# Patient Record
Sex: FEMALE | Race: BLACK OR AFRICAN AMERICAN
[De-identification: names, ages, dates, MRNs, and addresses within clinical notes are randomized per-mention and may not be internally consistent; named-entity substitution may affect disease eponyms.]

---

## 2018-05-15 ENCOUNTER — HOSPITAL ENCOUNTER (INPATIENT)
Dept: HOSPITAL 17 - NEPC | Age: 78
LOS: 2 days | Discharge: HOME HEALTH | DRG: 313 | End: 2018-05-17
Attending: HOSPITALIST | Admitting: HOSPITALIST
Payer: COMMERCIAL

## 2018-05-15 VITALS
HEART RATE: 73 BPM | SYSTOLIC BLOOD PRESSURE: 162 MMHG | RESPIRATION RATE: 20 BRPM | TEMPERATURE: 97.7 F | DIASTOLIC BLOOD PRESSURE: 93 MMHG | OXYGEN SATURATION: 99 %

## 2018-05-15 VITALS
DIASTOLIC BLOOD PRESSURE: 122 MMHG | SYSTOLIC BLOOD PRESSURE: 175 MMHG | RESPIRATION RATE: 16 BRPM | HEART RATE: 93 BPM | OXYGEN SATURATION: 100 % | TEMPERATURE: 98.4 F

## 2018-05-15 VITALS
OXYGEN SATURATION: 94 % | HEART RATE: 77 BPM | RESPIRATION RATE: 19 BRPM | SYSTOLIC BLOOD PRESSURE: 140 MMHG | DIASTOLIC BLOOD PRESSURE: 88 MMHG | TEMPERATURE: 98.1 F

## 2018-05-15 VITALS
HEART RATE: 78 BPM | RESPIRATION RATE: 16 BRPM | SYSTOLIC BLOOD PRESSURE: 152 MMHG | DIASTOLIC BLOOD PRESSURE: 96 MMHG | OXYGEN SATURATION: 96 %

## 2018-05-15 VITALS
DIASTOLIC BLOOD PRESSURE: 126 MMHG | RESPIRATION RATE: 15 BRPM | OXYGEN SATURATION: 99 % | HEART RATE: 82 BPM | SYSTOLIC BLOOD PRESSURE: 170 MMHG

## 2018-05-15 VITALS
TEMPERATURE: 98.3 F | DIASTOLIC BLOOD PRESSURE: 81 MMHG | OXYGEN SATURATION: 96 % | RESPIRATION RATE: 18 BRPM | HEART RATE: 102 BPM | SYSTOLIC BLOOD PRESSURE: 114 MMHG

## 2018-05-15 VITALS — DIASTOLIC BLOOD PRESSURE: 102 MMHG | SYSTOLIC BLOOD PRESSURE: 144 MMHG

## 2018-05-15 DIAGNOSIS — F17.210: ICD-10-CM

## 2018-05-15 DIAGNOSIS — E78.5: ICD-10-CM

## 2018-05-15 DIAGNOSIS — I25.2: ICD-10-CM

## 2018-05-15 DIAGNOSIS — Z90.710: ICD-10-CM

## 2018-05-15 DIAGNOSIS — R63.4: ICD-10-CM

## 2018-05-15 DIAGNOSIS — J44.9: ICD-10-CM

## 2018-05-15 DIAGNOSIS — K21.9: ICD-10-CM

## 2018-05-15 DIAGNOSIS — F32.9: ICD-10-CM

## 2018-05-15 DIAGNOSIS — I71.2: ICD-10-CM

## 2018-05-15 DIAGNOSIS — Z88.5: ICD-10-CM

## 2018-05-15 DIAGNOSIS — Z88.6: ICD-10-CM

## 2018-05-15 DIAGNOSIS — R07.89: Primary | ICD-10-CM

## 2018-05-15 DIAGNOSIS — Z88.8: ICD-10-CM

## 2018-05-15 DIAGNOSIS — I11.9: ICD-10-CM

## 2018-05-15 DIAGNOSIS — Z86.79: ICD-10-CM

## 2018-05-15 DIAGNOSIS — F03.90: ICD-10-CM

## 2018-05-15 DIAGNOSIS — G40.909: ICD-10-CM

## 2018-05-15 DIAGNOSIS — R73.03: ICD-10-CM

## 2018-05-15 DIAGNOSIS — R54: ICD-10-CM

## 2018-05-15 DIAGNOSIS — Z91.14: ICD-10-CM

## 2018-05-15 LAB
ALBUMIN SERPL-MCNC: 3.2 GM/DL (ref 3.4–5)
ALP SERPL-CCNC: 154 U/L (ref 45–117)
ALT SERPL-CCNC: 14 U/L (ref 10–53)
AST SERPL-CCNC: 28 U/L (ref 15–37)
BASOPHILS # BLD AUTO: 0 TH/MM3 (ref 0–0.2)
BASOPHILS NFR BLD: 0.9 % (ref 0–2)
BILIRUB SERPL-MCNC: 0.4 MG/DL (ref 0.2–1)
BUN SERPL-MCNC: 9 MG/DL (ref 7–18)
CALCIUM SERPL-MCNC: 8.1 MG/DL (ref 8.5–10.1)
CHLORIDE SERPL-SCNC: 105 MEQ/L (ref 98–107)
CREAT SERPL-MCNC: 0.77 MG/DL (ref 0.5–1)
EOSINOPHIL # BLD: 0 TH/MM3 (ref 0–0.4)
EOSINOPHIL NFR BLD: 0.5 % (ref 0–4)
ERYTHROCYTE [DISTWIDTH] IN BLOOD BY AUTOMATED COUNT: 13.6 % (ref 11.6–17.2)
GFR SERPLBLD BASED ON 1.73 SQ M-ARVRAT: 88 ML/MIN (ref 89–?)
GLUCOSE SERPL-MCNC: 82 MG/DL (ref 74–106)
HCO3 BLD-SCNC: 23.9 MEQ/L (ref 21–32)
HCT VFR BLD CALC: 39.7 % (ref 35–46)
HGB BLD-MCNC: 13.1 GM/DL (ref 11.6–15.3)
INR PPP: 1.1 RATIO
LYMPHOCYTES # BLD AUTO: 1.3 TH/MM3 (ref 1–4.8)
LYMPHOCYTES NFR BLD AUTO: 25.8 % (ref 9–44)
MAGNESIUM SERPL-MCNC: 1.8 MG/DL (ref 1.5–2.5)
MCH RBC QN AUTO: 32.4 PG (ref 27–34)
MCHC RBC AUTO-ENTMCNC: 32.9 % (ref 32–36)
MCV RBC AUTO: 98.7 FL (ref 80–100)
MONOCYTE #: 0.6 TH/MM3 (ref 0–0.9)
MONOCYTES NFR BLD: 12.8 % (ref 0–8)
NEUTROPHILS # BLD AUTO: 2.9 TH/MM3 (ref 1.8–7.7)
NEUTROPHILS NFR BLD AUTO: 60 % (ref 16–70)
PLATELET # BLD: 107 TH/MM3 (ref 150–450)
PMV BLD AUTO: 9.3 FL (ref 7–11)
PROT SERPL-MCNC: 7.2 GM/DL (ref 6.4–8.2)
PROTHROMBIN TIME: 11.6 SEC (ref 9.8–11.6)
RBC # BLD AUTO: 4.03 MIL/MM3 (ref 4–5.3)
SODIUM SERPL-SCNC: 139 MEQ/L (ref 136–145)
TROPONIN I SERPL-MCNC: (no result) NG/ML (ref 0.02–0.05)
WBC # BLD AUTO: 4.9 TH/MM3 (ref 4–11)

## 2018-05-15 PROCEDURE — 84484 ASSAY OF TROPONIN QUANT: CPT

## 2018-05-15 PROCEDURE — 71045 X-RAY EXAM CHEST 1 VIEW: CPT

## 2018-05-15 PROCEDURE — 85025 COMPLETE CBC W/AUTO DIFF WBC: CPT

## 2018-05-15 PROCEDURE — 93005 ELECTROCARDIOGRAM TRACING: CPT

## 2018-05-15 PROCEDURE — 80053 COMPREHEN METABOLIC PANEL: CPT

## 2018-05-15 PROCEDURE — 82550 ASSAY OF CK (CPK): CPT

## 2018-05-15 PROCEDURE — 71275 CT ANGIOGRAPHY CHEST: CPT

## 2018-05-15 PROCEDURE — 85730 THROMBOPLASTIN TIME PARTIAL: CPT

## 2018-05-15 PROCEDURE — 83690 ASSAY OF LIPASE: CPT

## 2018-05-15 PROCEDURE — 82552 ASSAY OF CPK IN BLOOD: CPT

## 2018-05-15 PROCEDURE — 83735 ASSAY OF MAGNESIUM: CPT

## 2018-05-15 PROCEDURE — 85610 PROTHROMBIN TIME: CPT

## 2018-05-15 PROCEDURE — 74174 CTA ABD&PLVS W/CONTRAST: CPT

## 2018-05-15 RX ADMIN — ATORVASTATIN CALCIUM SCH MG: 40 TABLET, FILM COATED ORAL at 20:43

## 2018-05-15 RX ADMIN — METOPROLOL TARTRATE SCH MG: 25 TABLET, FILM COATED ORAL at 20:43

## 2018-05-15 RX ADMIN — DONEPEZIL HYDROCHLORIDE SCH MG: 5 TABLET, FILM COATED ORAL at 20:43

## 2018-05-15 RX ADMIN — Medication SCH ML: at 20:44

## 2018-05-15 RX ADMIN — PHENYTOIN SODIUM SCH MG: 100 CAPSULE, EXTENDED RELEASE ORAL at 20:43

## 2018-05-15 NOTE — HHI.HP
__________________________________________________





HPI


Service


[facility]


Primary Care Physician


Unknown


Admission Diagnosis





Chest pain, history of AAA, history dissection, hypertension


Diagnoses:  


Travel History


International Travel<30 Days:  No


Contact w/Intl Traveler <30 Da:  No


Traveled to Known Affected Are:  No


History of Present Illness





78-year-old female history of dementia, thoracic aortic aneurysm who presents 

with a one-month history of intermittent sharp left sided chest pain radiating 

to left arm as well as stomach.  She is currently asymptomatic. she reports 

that this has been going on since April, occurs usually when she is upset that 

her  has fallen out of bed.  She says she has run out of some of her 

medications recently but is not sure of the names of them.  She says she was 

supposed to go to a doctor's appointment today but missed it, which is why she 

is here.  Again, she denies any current pain.  She denies any nausea, vomiting, 

lightheadedness, dizziness, fevers, chills. 





 She does report chronic ongoing weight loss, unable to quantify, which she 

attributes to poor appetite.





Review of Systems


Except as stated in HPI:  all other systems reviewed are Neg





Past Family Social History


Past Medical History


Hypertension


COPD


Abdominal and thoracic aortic aneurysm


Seizure disorder


Depression


GERD


Past Surgical History


Left forearm surgery


Brain surgery for aneurysm over 15 years ago.


Hysterectomy


Reported Medications


Reported Meds & Active Scripts


Active


Reported


Vitamin B Complex-C (B Complex W/ C) 1 Cap Cap   


Meloxicam 7.5 Mg Tab 7.5 Mg PO DAILY


Atorvastatin (Atorvastatin Calcium) 40 Mg Tab 40 Mg PO HS


Decara (Cholecalciferol) 50,000 Unit Cap 50,000 Units PO Q7D


Trazodone (Trazodone HCl) 50 Mg Tab 2 Tab PO HS


Metoprolol Tartrate 25 Mg Tab 25 Mg PO BID


Hydrochlorothiazide 25 Mg Tab 25 Mg PO DAILY


Phenytoin Extended 100 Mg Cap 100 Mg PO BID


Omeprazole 40 Mg Cap 40 Mg  DAILY


Ventolin Hfa 18 GM Inh (Albuterol Sulfate) 90 Mcg/Act Aer 2 Puff INH Q4-6H PRN


Sertraline (Sertraline HCl) 50 Mg Tab 50 Mg PO DAILY


Losartan (Losartan Potassium) 50 Mg Tab 50 Mg PO DAILY


Donepezil 5 Mg Tab 5 Mg PO HS


Allergies:  


Coded Allergies:  


     *MDRO Multi-Drug Resistant Organism (Verified  Allergy, Unknown, 5/15/18)


 C-diff 2014


     acetaminophen (Verified  Allergy, Unknown, 5/15/18)


     codeine (Verified  Allergy, Unknown, 5/15/18)


     MRI PRECAUTION (Verified  Adverse Reaction, Severe, ANEURYSM CLIP-(JLT)   DR. MERCADO, 5/15/18)


Family History


Patient says father had a stroke, mother had a heart problem


Social History


Patient reports that she smokes 2 cigarettes per day, and has smoked for the 

past 50 years.  Denies any alcohol or illicit drugs.





Physical Exam


Vital Signs





Vital Signs








  Date Time  Temp Pulse Resp B/P (MAP) Pulse Ox O2 Delivery O2 Flow Rate FiO2


 


5/15/18 17:07 97.7 73 20 162/93 (116) 99   


 


5/15/18 16:25        


 


5/15/18 15:46 98.1 77 19 140/88 (105) 94 Room Air  


 


5/15/18 12:17    144/102 (116)    


 


5/15/18 11:31  78 16 152/96 (114) 96 Room Air  


 


5/15/18 10:45  82 15 170/126 (141) 99 Room Air  


 


5/15/18 10:16  90 17  99 Room Air  


 


5/15/18 10:16 98.4 93 16 175/117 (136) 100 Room Air  





    186/122 (143)    








Physical Exam


GENERAL: This is a well-nourished, well-developed patient, in no apparent 

distress. 


SKIN: No rashes, ecchymoses or lesions. Cool and dry.


HEAD: Atraumatic. Normocephalic. No temporal or scalp tenderness.


EYES: Pupils equal round and reactive. Extraocular motions intact. No scleral 

icterus. No injection or drainage. 


ENT: Nose without bleeding, purulent drainage or septal hematoma. Throat 

without erythema, tonsillar hypertrophy or exudate. Uvula midline. Airway 

patent.


NECK: Trachea midline. No JVD or lymphadenopathy. Supple, nontender, no 

meningeal signs.


CARDIOVASCULAR: Regular rate and rhythm without murmurs, gallops, or rubs.  

Pulses equal bilaterally in upper extremities.


RESPIRATORY: Clear to auscultation. Breath sounds equal bilaterally. No wheezes

, rales, or rhonchi.  


GASTROINTESTINAL: Abdomen soft, non-tender, nondistended. No hepato-splenomegaly

, or palpable masses. No guarding.


MUSCULOSKELETAL: Extremities without clubbing, cyanosis, or edema. No joint 

tenderness, effusion, or edema noted. No calf tenderness. Negative Homans sign 

bilaterally.


NEUROLOGICAL: Awake and alert. Cranial nerves II through XII intact.  Motor and 

sensory grossly within normal limits. Five out of 5 muscle strength in all 

muscle groups.  Normal speech.


Laboratory





Laboratory Tests








Test


  5/15/18


10:40


 


White Blood Count 4.9 


 


Red Blood Count 4.03 


 


Hemoglobin 13.1 


 


Hematocrit 39.7 


 


Mean Corpuscular Volume 98.7 


 


Mean Corpuscular Hemoglobin 32.4 


 


Mean Corpuscular Hemoglobin


Concent 32.9 


 


 


Red Cell Distribution Width 13.6 


 


Platelet Count 107 


 


Mean Platelet Volume 9.3 


 


Neutrophils (%) (Auto) 60.0 


 


Lymphocytes (%) (Auto) 25.8 


 


Monocytes (%) (Auto) 12.8 


 


Eosinophils (%) (Auto) 0.5 


 


Basophils (%) (Auto) 0.9 


 


Neutrophils # (Auto) 2.9 


 


Lymphocytes # (Auto) 1.3 


 


Monocytes # (Auto) 0.6 


 


Eosinophils # (Auto) 0.0 


 


Basophils # (Auto) 0.0 


 


CBC Comment DIFF FINAL 


 


Differential Comment  


 


Prothrombin Time 11.6 


 


Prothromb Time International


Ratio 1.1 


 


 


Activated Partial


Thromboplast Time 26.0 


 


 


Blood Urea Nitrogen 9 


 


Creatinine 0.77 


 


Random Glucose 82 


 


Total Protein 7.2 


 


Albumin 3.2 


 


Calcium Level 8.1 


 


Magnesium Level 1.8 


 


Alkaline Phosphatase 154 


 


Aspartate Amino Transf


(AST/SGOT) 28 


 


 


Alanine Aminotransferase


(ALT/SGPT) 14 


 


 


Total Bilirubin 0.4 


 


Sodium Level 139 


 


Potassium Level 3.7 


 


Chloride Level 105 


 


Carbon Dioxide Level 23.9 


 


Anion Gap 10 


 


Estimat Glomerular Filtration


Rate 88 


 


 


Total Creatine Kinase 87 


 


Troponin I LESS THAN 0.02 


 


Lipase 483 








Result Diagram:  


5/15/18 1040                                                                   

             5/15/18 1040





Imaging





Last Impressions








Chest X-Ray 5/15/18 1031 Signed





Impressions: 





 Service Date/Time:  Tuesday, May 15, 2018 11:10 - CONCLUSION: No acute 

disease.  





      Keanu Best MD 


 


Aorta CTA 5/15/18 1031 Signed





Impressions: 





 Service Date/Time:  Tuesday, May 15, 2018 12:25 - CONCLUSION:  1. The 





 examination demonstrates aneurysmal dilation of the ascending aorta aortic 

arch 





 and descending thoracic aorta. The aorta at its widest point in the arch 





 measures approximately 3.8 cm. This is only slightly larger than seen on 





 previous exam it should be noted on the prior exam there appears to have been 

an 





 acute thrombosis of a dissection flap. This has significantly improved in 





 appearance with partial healing. The proximal descending thoracic aorta is at 





 the upper limits of normal in caliber measuring 2.7 cm. 2. The distal thoracic 





 aorta demonstrates a focal area of contrast extending out into the mural 





 thrombus. This appears to be feeding the origin of an intercostal artery. 

There 





 was contrast in this area on the previous study of  however, it appears 

more 





 organized and slightly larger on today's exam.  3. The distalmost portion of 





 thoracic aorta is aneurysmal. Maximum dimension is essentially at the 





 diaphragmatic hiatus. The aorta measures 5.4 centimeters at this level. This 

is 





 similar in size compared to previous. 4. The abdominal aorta is aneurysmal in 





 its distal segment measuring 5.6 cm. This has enlarged when compared to 





 previous. The aorta measured 3.7 cm at this location on the prior. 5. 

Aneurysmal 





 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 The right 





 measures approximately 2.5 cm. The iliac circulation is mildly enlarged 

compared 





 to previous as well. Maximum dimension on the left was 2.2 cm on the prior.   

  





 Riaz Wilde MD 











Caprini VTE Risk Assessment


Caprini VTE Risk Assessment:  Mod/High Risk (score >= 2)


Caprini Risk Assessment Model











 Point Value = 1          Point Value = 2  Point Value = 3  Point Value = 5


 


Age 41-60


Minor surgery


BMI > 25 kg/m2


Swollen legs


Varicose veins


Pregnancy or postpartum


History of unexplained or recurrent


   spontaneous 


Oral contraceptives or hormone


   replacement


Sepsis (< 1 month)


Serious lung disease, including


   pneumonia (< 1 month)


Abnormal pulmonary function


Acute myocardial infarction


Congestive heart failure (< 1 month)


History of inflammatory bowel disease


Medical patient at bed rest Age 61-74


Arthroscopic surgery


Major open surgery (> 45 min)


Laparoscopic surgery (> 45 min)


Malignancy


Confined to bed (> 72 hours)


Immobilizing plaster cast


Central venous access Age >= 75


History of VTE


Family history of VTE


Factor V Leiden


Prothrombin 12857D


Lupus anticoagulant


Anticardiolipin antibodies


Elevated serum homocysteine


Heparin-induced thrombocytopenia


Other congenital or acquired


   thrombophilia Stroke (< 1 month)


Elective arthroplasty


Hip, pelvis, or leg fracture


Acute spinal cord injury (< 1 month)








Prophylaxis Regimen











   Total Risk


Factor Score Risk Level Prophylaxis Regimen


 


0-1      Low Early ambulation


 


2 Moderate Order ONE of the following:


*Sequential Compression Device (SCD)


*Heparin 5000 units SQ BID


 


3-4 Higher Order ONE of the following medications:


*Heparin 5000 units SQ TID


*Enoxaparin/Lovenox 40 mg SQ daily (WT < 150 kg, CrCl > 30 mL/min)


*Enoxaparin/Lovenox 30 mg SQ daily (WT < 150 kg, CrCl > 10-29 mL/min)


*Enoxaparin/Lovenox 30 mg SQ BID (WT < 150 kg, CrCl > 30 mL/min)


AND/OR


*Sequential Compression Device (SCD)


 


5 or more Highest Order ONE of the following medications:


*Heparin 5000 units SQ TID (Preferred with Epidurals)


*Enoxaparin/Lovenox 40 mg SQ daily (WT < 150 kg, CrCl > 30 mL/min)


*Enoxaparin/Lovenox 30 mg SQ daily (WT < 150 kg, CrCl > 10-29 mL/min)


*Enoxaparin/Lovenox 30 mg SQ BID (WT < 150 kg, CrCl > 30 mL/min)


AND


*Sequential Compression Device (SCD)











Assessment and Plan


Assessment and Plan


//Intermittent chest pain.  Currently asymptomatic


//History of thoracic aortic aneurysm.


= Patient previously noted not to be a surgical candidate for thoracic aortic 

aneurysm.


= Imaging of thoracic aorta as above with some gradual changes, however no 

acute changes


= Systolic blood pressures in the 180s on presentation.


= Blood pressure control.  Would be under systolic of 120.


= Trend EKGs and troponins.


= Apparent new T-wave inversion on EKG.  Cardiology consultation.





//Accelerated hypertension.  With systolic blood pressures in the 180s on 

admission.


= Restart home blood pressure meds and monitor.





//Chronic severe dementia.  Continue donepezil.








//Medication noncompliance


= Secondary to dementia





//Seizure disorder.  Chronic.


Continue phenytoin.





//Depression.  Chronic.  Continue Zoloft.  Will hold off on trazodone due to 

hypertension.





//GERD.  Chronic.  Continue PPI.





//Tobacco abuse.  Patient counseled on cessation.





//Chronic ongoing weight loss.  This could be secondary to dementia.  Tobacco 

cessation could be helpful.  Encourage p.o. intake.


Discussed Condition With


Patient, nurse, ED physician.











Hunter Nugent MD May 15, 2018 17:53

## 2018-05-15 NOTE — RADRPT
EXAM DATE/TIME:  05/15/2018 12:25 

 

[facility] COMPARISON:     

CTA THORACIC ABDOMINAL AORTA W 3D RECON, October 23, 2015, 3:46.

 

 

INDICATIONS :     

Abdominal and chest pain, left arm weakness.

                           

 

IV CONTRAST:     

150 cc Omnipaque 350 (iohexol) IV 

 

 

RADIATION DOSE:     

4.52 CTDIvol (mGy) 

 

 

MEDICAL HISTORY :     

Dementia. Cardiovascular disease Hypertension.Abdominal aneurysm

 

SURGICAL HISTORY :      

Hysterectomy. 

 

ENCOUNTER:      

Initial

 

ACUITY:      

1 week

 

PAIN SCALE:      

5/10

 

LOCATION:        

chest 

 

TECHNIQUE:     

Volumetric scanning was performed using a multi-row detector CT scanner.  The data was post processed
 with a variety of visualization algorithms including full volume maximum intensity projection, multi
-planar sliding thin slab reformation, curved planar reformation, and surface rendering techniques.  
Using automated exposure control and adjustment of the mA and/or kV according to patient size, radiat
ion dose was kept as low as reasonably achievable to obtain optimal diagnostic quality images.  DICOM
 format image data is available electronically for review and comparison.  

 

FINDINGS:     

 

LUNGS:     

The visualized pulmonary parenchyma demonstrates moderate COPD changes. No suspicious mass lesions ar
e identified. The heart is normal in size.

 

MEDIASTINUM:     

No significant hilar, mediastinal or axillary adenopathy is present.

 

ABDOMEN:     

The solid organs of the abdomen are grossly intact by arterial phase imaging. No retroperitoneal lucas
opathy is identified. There is no free air or free fluid.

 

PELVIS:     

There is no significant free fluid in the pelvis. No free air is seen. No iliac or inguinal adenopath
y is present.

 

AORTA/ILIAC :       

The examination demonstrates the aortic root to be normal in caliber. There is aneurysmal dilation of
 the ascending aorta with a maximum dimension of 3.7 cm. The arch is significantly dilated measuring 
3.8 cm in its mid aspect. There is normal anatomic branching of the great vessels from the arch. The 
origins of the great vessels are widely patent. The aorta tapers in size along the course of the dist
al arch and at measures approximately 2.7 cm in its proximal aspect. Distally, the examination demons
trates contrast projecting into the mural thrombus in its mid thoracic segment. This appears to be fe
eding in intercostal artery at this location. Beneath this there is significant aneurysmal enlargemen
t of the distal thoracic aorta measuring approximately 5.4 cm in its distal thoracic segment. The aor
ta remains aneurysmal across the origins of the celiac, SMA and renal arteries. Inferiorly the infrar
enal aorta is quite aneurysmal measuring 5.6 cm just above the iliac bifurcation. The aneurysmal dila
tion extends down to and involves the proximal common iliac arteries. The left common iliac measures 
5.3 CM. The proximal right common iliac measures 2.3 CM. The external iliac arteries are unopacified 
but appear normal in caliber.

 

CONCLUSION:     

1. The examination demonstrates aneurysmal dilation of the ascending aorta aortic arch and descending
 thoracic aorta. The aorta at its widest point in the arch measures approximately 3.8 cm. This is onl
y slightly larger than seen on previous exam it should be noted on the prior exam there appears to ha
ve been an acute thrombosis of a dissection flap. This has significantly improved in appearance with 
partial healing. The proximal descending thoracic aorta is at the upper limits of normal in caliber m
easuring 2.7 cm.

2. The distal thoracic aorta demonstrates a focal area of contrast extending out into the mural throm
bus. This appears to be feeding the origin of an intercostal artery. There was contrast in this area 
on the previous study of 2015 however, it appears more organized and slightly larger on today's exam.
 

3. The distalmost portion of thoracic aorta is aneurysmal. Maximum dimension is essentially at the di
aphragmatic hiatus. The aorta measures 5.4 centimeters at this level. This is similar in size compare
d to previous.

4. The abdominal aorta is aneurysmal in its distal segment measuring 5.6 cm. This has enlarged when c
ompared to previous. The aorta measured 3.7 cm at this location on the prior.

5. Aneurysmal dilation of the iliac arteries bilaterally the left measures 3.3 cm. The right measures
 approximately 2.5 cm. The iliac circulation is mildly enlarged compared to previous as well. Maximum
 dimension on the left was 2.2 cm on the prior. 

 

 

 Riaz Wilde MD on May 15, 2018 at 13:08           

Board Certified Radiologist.

 This report was verified electronically.

## 2018-05-15 NOTE — RADRPT
EXAM DATE/TIME:  05/15/2018 11:10 

 

[facility] COMPARISON:     

No previous studies available for comparison.

 

                     

INDICATIONS :     

Short of breath with chest tightness.

                     

 

MEDICAL HISTORY :     

Congestive heart failure.          

 

SURGICAL HISTORY :     

None.   

 

ENCOUNTER:     

Initial                                        

 

ACUITY:     

2 days      

 

PAIN SCORE:     

2/10

 

LOCATION:     

Bilateral chest 

 

FINDINGS:     

Hyperinflation and cardiomegaly. Atherosclerotic calcifications are seen. There is no consolidation o
r effusion. Descending thoracic aortic aneurysm with again noted.

 

CONCLUSION:     No acute disease.  

 

 

 

 Keanu Best MD on May 15, 2018 at 11:48           

Board Certified Radiologist.

 This report was verified electronically.

## 2018-05-15 NOTE — MB
cc:

Devante Stafford MD

****

 

 

DATE:

05/15/2018

 

REASON FOR CONSULTATION:

For evaluation of chest pain.

 

HISTORY OF PRESENT ILLNESS:

Katey Blake is an 82-year-old woman with hypertensive heart 

disease.  She has aortic aneurysms.  She was evaluated in [facility].  She

said she was told that she would not survive surgery on her aneurysms.

 She has had documented severe left ventricular hypertrophy by echo 

and by EKG and her blood pressure is poorly controlled.  She has been 

having intermittent chest pain for 2 weeks.  She describes it as an 

ache-like pain in the left chest; it comes and goes.  She is not 

currently having any.  Her blood pressure was 186/122 when she came to

the ER.  Apparently has had a previous aortic dissection based on the 

reading of the CT scan, which is showing some healing.  She is a 

lifelong smoker.  She has not smoked in about 5 days, but has not 

formally made a decision to quit smoking.  Her last nuclear stress 

test was 11/2014 and this showed no stress-induced ischemia.  Her 

initial troponin is normal.

 

PAST MEDICAL HISTORY:

Includes hypertensive heart disease.  There is mention in her history 

of some dementia, although she seems alert.  Hyperlipidemia, 

hypertension, COPD, murmur of aortic valve sclerosis, previous 

pancreatitis, previous seizures, tobacco use disorder.

 

PAST SURGICAL HISTORY:

Includes cerebral aneurysm surgery in 1998, cholecystectomy, 

hysterectomy.

 

SOCIAL HISTORY:

He is .  She smokes.  Denies alcohol.

 

ALLERGIES:

INCLUDE TYLENOL #3.

 

FAMILY HISTORY:

Positive for stroke in the father and heart disease in the mother.

 

PHYSICAL EXAMINATION:

GENERAL:  A thin -American female, alert and oriented, no acute

distress.

VITAL SIGNS:  Charted.   Blood pressure is very elevated.

HEENT:  Exam unremarkable.

NECK:  No JVD.  No bruits.

CHEST:  Shows diminished breath sounds throughout.

CARDIAC:  S1, S2.  Regular rate and rhythm with a prominent 2/6 

systolic ejection murmur suggestive of aortic valve sclerosis.

ABDOMEN:  Soft, nontender.

EXTREMITIES:  Reveal diminished pedal pulses.

 

LABORATORY DATA:

Charted.

Troponin is negative.

 

IMAGING STUDIES:

CTA was abnormal.  See report.

 

IMPRESSION:

1.  Hypertensive heart disease with severe uncontrolled hypertension. 

 

2.  Chest pain without currently any objective evidence for ischemia. 

 

3.  Electrocardiogram shows left ventricular hypertrophy, left 

ventricular strain from chronic hypertension and severe left 

ventricular hypertrophy.

4.  Tobacco abuse.

 

RECOMMENDATIONS:

1.  Quit smoking.

2.  Need to get her blood pressure under better control.

3.  May need a nephrology consult.

4.  We will see how she responds to the current medication regimen and

adjust as needed.

5.  Serial troponins to rule out myocardial infarction.

6.  Further therapy to be determined.

 

 

__________________________________

MD CED Lindo/SAVANA

D: 05/15/2018, 06:36 PM

T: 05/15/2018, 06:52 PM

Visit #: [id number]

Job #: [id number]

## 2018-05-15 NOTE — EKG
Date Performed: 05/15/2018       Time Performed: 10:27:00

 

PTAGE:      78 years

 

EKG:      Sinus rhythm 

 

 LEFT ATRIAL ENLARGEMENT POSSIBLE RIGHT VENTRICULAR CONDUCTION DELAY POSSIBLE LEFT VENTRICULAR HYPERT
ROPHY MODERATE T-WAVE ABNORMALITY, CONSIDER ANTEROLATERAL ISCHEMIA ABNORMAL ECG When compared to prio
r tracing,patient is now in sinus rythm and has P-wave inversions consistent with ischemia or hypertr
ophy. clinical correlation requested. 

 

 PREVIOUS TRACING            : 10/22/2015 08.12

 

DOCTOR:   Josephine Valverde  Interpretating Date/Time  05/15/2018 19:40:27

## 2018-05-16 VITALS
HEART RATE: 104 BPM | RESPIRATION RATE: 16 BRPM | TEMPERATURE: 99.6 F | SYSTOLIC BLOOD PRESSURE: 97 MMHG | OXYGEN SATURATION: 98 % | DIASTOLIC BLOOD PRESSURE: 76 MMHG

## 2018-05-16 VITALS
TEMPERATURE: 98.1 F | OXYGEN SATURATION: 98 % | HEART RATE: 73 BPM | RESPIRATION RATE: 18 BRPM | DIASTOLIC BLOOD PRESSURE: 70 MMHG | SYSTOLIC BLOOD PRESSURE: 110 MMHG

## 2018-05-16 VITALS
RESPIRATION RATE: 18 BRPM | HEART RATE: 83 BPM | TEMPERATURE: 98.5 F | OXYGEN SATURATION: 97 % | SYSTOLIC BLOOD PRESSURE: 129 MMHG | DIASTOLIC BLOOD PRESSURE: 87 MMHG

## 2018-05-16 VITALS
HEART RATE: 88 BPM | SYSTOLIC BLOOD PRESSURE: 149 MMHG | RESPIRATION RATE: 18 BRPM | DIASTOLIC BLOOD PRESSURE: 96 MMHG | TEMPERATURE: 98.3 F | OXYGEN SATURATION: 98 %

## 2018-05-16 VITALS
OXYGEN SATURATION: 97 % | HEART RATE: 87 BPM | RESPIRATION RATE: 16 BRPM | SYSTOLIC BLOOD PRESSURE: 92 MMHG | DIASTOLIC BLOOD PRESSURE: 58 MMHG | TEMPERATURE: 97.6 F

## 2018-05-16 VITALS — SYSTOLIC BLOOD PRESSURE: 110 MMHG | DIASTOLIC BLOOD PRESSURE: 75 MMHG

## 2018-05-16 VITALS — HEART RATE: 88 BPM

## 2018-05-16 LAB
ALBUMIN SERPL-MCNC: 3.3 GM/DL (ref 3.4–5)
ALP SERPL-CCNC: 174 U/L (ref 45–117)
ALT SERPL-CCNC: 15 U/L (ref 10–53)
AST SERPL-CCNC: 28 U/L (ref 15–37)
BASOPHILS # BLD AUTO: 0 TH/MM3 (ref 0–0.2)
BASOPHILS NFR BLD: 0.9 % (ref 0–2)
BILIRUB SERPL-MCNC: 0.3 MG/DL (ref 0.2–1)
BUN SERPL-MCNC: 12 MG/DL (ref 7–18)
CALCIUM SERPL-MCNC: 8.9 MG/DL (ref 8.5–10.1)
CHLORIDE SERPL-SCNC: 102 MEQ/L (ref 98–107)
CREAT SERPL-MCNC: 0.8 MG/DL (ref 0.5–1)
EOSINOPHIL # BLD: 0.1 TH/MM3 (ref 0–0.4)
EOSINOPHIL NFR BLD: 1.1 % (ref 0–4)
ERYTHROCYTE [DISTWIDTH] IN BLOOD BY AUTOMATED COUNT: 13.8 % (ref 11.6–17.2)
GFR SERPLBLD BASED ON 1.73 SQ M-ARVRAT: 84 ML/MIN (ref 89–?)
GLUCOSE SERPL-MCNC: 101 MG/DL (ref 74–106)
HCO3 BLD-SCNC: 30.4 MEQ/L (ref 21–32)
HCT VFR BLD CALC: 38.8 % (ref 35–46)
HGB BLD-MCNC: 12.9 GM/DL (ref 11.6–15.3)
LYMPHOCYTES # BLD AUTO: 1.5 TH/MM3 (ref 1–4.8)
LYMPHOCYTES NFR BLD AUTO: 26.7 % (ref 9–44)
MCH RBC QN AUTO: 32.5 PG (ref 27–34)
MCHC RBC AUTO-ENTMCNC: 33.1 % (ref 32–36)
MCV RBC AUTO: 98.1 FL (ref 80–100)
MONOCYTE #: 0.7 TH/MM3 (ref 0–0.9)
MONOCYTES NFR BLD: 12 % (ref 0–8)
NEUTROPHILS # BLD AUTO: 3.4 TH/MM3 (ref 1.8–7.7)
NEUTROPHILS NFR BLD AUTO: 59.3 % (ref 16–70)
PLATELET # BLD: 102 TH/MM3 (ref 150–450)
PMV BLD AUTO: 9.4 FL (ref 7–11)
PROT SERPL-MCNC: 7.5 GM/DL (ref 6.4–8.2)
RBC # BLD AUTO: 3.96 MIL/MM3 (ref 4–5.3)
SODIUM SERPL-SCNC: 139 MEQ/L (ref 136–145)
TROPONIN I SERPL-MCNC: 0.3 NG/ML (ref 0.02–0.05)
TROPONIN I SERPL-MCNC: 0.36 NG/ML (ref 0.02–0.05)
WBC # BLD AUTO: 5.7 TH/MM3 (ref 4–11)

## 2018-05-16 RX ADMIN — HYDROCHLOROTHIAZIDE SCH MG: 25 TABLET ORAL at 08:23

## 2018-05-16 RX ADMIN — DONEPEZIL HYDROCHLORIDE SCH MG: 5 TABLET, FILM COATED ORAL at 23:09

## 2018-05-16 RX ADMIN — Medication SCH ML: at 08:24

## 2018-05-16 RX ADMIN — NIFEDIPINE SCH MG: 90 TABLET, FILM COATED, EXTENDED RELEASE ORAL at 08:23

## 2018-05-16 RX ADMIN — METOPROLOL TARTRATE SCH MG: 25 TABLET, FILM COATED ORAL at 08:24

## 2018-05-16 RX ADMIN — SERTRALINE HYDROCHLORIDE SCH MG: 50 TABLET, FILM COATED ORAL at 08:22

## 2018-05-16 RX ADMIN — PHENYTOIN SODIUM SCH MG: 100 CAPSULE, EXTENDED RELEASE ORAL at 23:09

## 2018-05-16 RX ADMIN — PANTOPRAZOLE SCH MG: 40 TABLET, DELAYED RELEASE ORAL at 08:22

## 2018-05-16 RX ADMIN — PHENYTOIN SODIUM SCH MG: 100 CAPSULE, EXTENDED RELEASE ORAL at 08:23

## 2018-05-16 RX ADMIN — Medication SCH ML: at 23:09

## 2018-05-16 RX ADMIN — LOSARTAN POTASSIUM SCH MG: 50 TABLET, FILM COATED ORAL at 08:23

## 2018-05-16 RX ADMIN — ATORVASTATIN CALCIUM SCH MG: 40 TABLET, FILM COATED ORAL at 23:08

## 2018-05-16 RX ADMIN — METOPROLOL TARTRATE SCH MG: 25 TABLET, FILM COATED ORAL at 23:09

## 2018-05-16 RX ADMIN — ASPIRIN 81 MG SCH MG: 81 TABLET ORAL at 08:23

## 2018-05-16 NOTE — EKG
Date Performed: 05/15/2018       Time Performed: 16:55:58

 

PTAGE:      78 years

 

EKG:      Sinus rhythm 

 

 LEFT ATRIAL ENLARGEMENT POSSIBLE RIGHT VENTRICULAR CONDUCTION DELAY POSSIBLE LEFT VENTRICULAR HYPERT
ROPHY ST DEVIATION AND MARKED T-WAVE ABNORMALITY, CONSIDER ANTEROLATERAL ISCHEMIA ABNORMAL ECG

 

PREVIOUS TRACING       : 05/15/2018 10.27 Since the previous tracing, no significant change noted

 

DOCTOR:   Philip Lau  Interpretating Date/Time  05/16/2018 22:29:41

## 2018-05-16 NOTE — EKG
Date Performed: 05/15/2018       Time Performed: 22:09:50

 

PTAGE:      78 years

 

EKG:      Sinus rhythm 

 

 LEFT ATRIAL ENLARGEMENT POSSIBLE RIGHT VENTRICULAR CONDUCTION DELAY LEFT VENTRICULAR HYPERTROPHY AND
 ST-T CHANGE ABNORMAL ECG

 

PREVIOUS TRACING       : 05/15/2018 16.55 Since the previous tracing, no significant change noted

 

DOCTOR:   Philip Lau  Interpretating Date/Time  05/16/2018 22:22:12

## 2018-05-16 NOTE — HHI.PR
Subjective


Remarks


Follow up for chest pain, elevated troponin. The patient reports she woke up 

with left upper abdominal pain today and some associated nausea, but no 

vomiting.  She describes the pain as a 10/10 constant ache, without radiation.  

Denies any shortness of breath or diaphoresis.  Denies any specific chest pains 

today.  Denies any other medical complaints at this time.





Objective


Vitals





Vital Signs








  Date Time  Temp Pulse Resp B/P (MAP) Pulse Ox O2 Delivery O2 Flow Rate FiO2


 


5/16/18 07:35 97.6 87 16 92/58 (69) 97   


 


5/16/18 04:39 98.3 88 18 149/96 (113) 98   


 


5/16/18 04:00  88      


 


5/16/18 03:07 98.5 83 18 129/87 (101) 97   


 


5/15/18 20:07 98.3 102 18 114/81 (92) 96   


 


5/15/18 17:07 97.7 73 20 162/93 (116) 99   


 


5/15/18 16:25        


 


5/15/18 15:46 98.1 77 19 140/88 (105) 94 Room Air  


 


5/15/18 12:17    144/102 (116)    


 


5/15/18 11:31  78 16 152/96 (114) 96 Room Air  


 


5/15/18 10:45  82 15 170/126 (141) 99 Room Air  


 


5/15/18 10:16  90 17  99 Room Air  


 


5/15/18 10:16 98.4 93 16 175/117 (136) 100 Room Air  





    186/122 (143)    














I/O      


 


 5/15/18 5/15/18 5/15/18 5/16/18 5/16/18 5/16/18





 07:00 15:00 23:00 07:00 15:00 23:00


 


Intake Total  300 ml  720 ml  


 


Balance  300 ml  720 ml  


 


      


 


Intake Oral  300 ml  720 ml  


 


# Voids    2  








Result Diagram:  


5/16/18 0202                                                                   

             5/16/18 0202





Imaging





Last Impressions








Chest X-Ray 5/15/18 1031 Signed





Impressions: 





 Service Date/Time:  Tuesday, May 15, 2018 11:10 - CONCLUSION: No acute 

disease.  





      Keanu Best MD 


 


Aorta CTA 5/15/18 1031 Signed





Impressions: 





 Service Date/Time:  Tuesday, May 15, 2018 12:25 - CONCLUSION:  1. The 





 examination demonstrates aneurysmal dilation of the ascending aorta aortic 

arch 





 and descending thoracic aorta. The aorta at its widest point in the arch 





 measures approximately 3.8 cm. This is only slightly larger than seen on 





 previous exam it should be noted on the prior exam there appears to have been 

an 





 acute thrombosis of a dissection flap. This has significantly improved in 





 appearance with partial healing. The proximal descending thoracic aorta is at 





 the upper limits of normal in caliber measuring 2.7 cm. 2. The distal thoracic 





 aorta demonstrates a focal area of contrast extending out into the mural 





 thrombus. This appears to be feeding the origin of an intercostal artery. 

There 





 was contrast in this area on the previous study of 2015 however, it appears 

more 





 organized and slightly larger on today's exam.  3. The distalmost portion of 





 thoracic aorta is aneurysmal. Maximum dimension is essentially at the 





 diaphragmatic hiatus. The aorta measures 5.4 centimeters at this level. This 

is 





 similar in size compared to previous. 4. The abdominal aorta is aneurysmal in 





 its distal segment measuring 5.6 cm. This has enlarged when compared to 





 previous. The aorta measured 3.7 cm at this location on the prior. 5. 

Aneurysmal 





 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 The right 





 measures approximately 2.5 cm. The iliac circulation is mildly enlarged 

compared 





 to previous as well. Maximum dimension on the left was 2.2 cm on the prior.   

  





 Riaz Wilde MD 








Objective Remarks


GENERAL: Well-nourished, well-developed thin female patient in NAD.


SKIN: Warm and dry. No rash.


HEENT:  Normocephalic. Atraumatic. Pupils equal and round.  Mucous membranes 

pink and moist.


NECK: Supple. Trachea midline.  


CARDIOVASCULAR: Regular rate and rhythm.  No murmur appreciated. 


RESPIRATORY: No accessory muscle use. Clear to auscultation. Breath sounds 

equal bilaterally.  


GASTROINTESTINAL: Abdomen soft, nondistended, mildly tender in left upper 

quadrant. Normoactive bowel sounds x4.  Pulsatile abdomen.


MUSCULOSKELETAL: No obvious deformities. Extremities without clubbing, cyanosis

, or edema. 


NEUROLOGICAL: Awake and alert. No obvious cranial nerve deficits.  Motor 

grossly within normal limits. Moving all extremities spontaneously. Normal 

speech.


PSYCHIATRIC: Appropriate mood and affect; insight and judgment normal.


Medications and IVs





Current Medications








 Medications


  (Trade)  Dose


 Ordered  Sig/Jamee


 Route  Start Time


 Stop Time Status Last Admin


 


  (NS Flush)  2 ml  UNSCH  PRN


 IVF  5/15/18 10:45


    5/15/18 10:41


 


 


  (NS Flush)  2 ml  UNSCH  PRN


 IV FLUSH  5/15/18 14:45


     


 


 


  (NS Flush)  2 ml  BID


 IV FLUSH  5/15/18 21:00


    5/16/18 08:24


 


 


  (Narcan Inj)  0.4 mg  UNSCH  PRN


 IV PUSH  5/15/18 14:45


     


 


 


  (Milk Of


 Magnesia Liq)  30 ml  Q12H  PRN


 PO  5/15/18 14:45


     


 


 


  (Senokot)  17.2 mg  Q12H  PRN


 PO  5/15/18 14:45


     


 


 


  (Dulcolax Supp)  10 mg  DAILY  PRN


 RECTAL  5/15/18 14:45


     


 


 


  (Lactulose Liq)  30 ml  DAILY  PRN


 PO  5/15/18 14:45


     


 


 


  (Vasotec Inj)  1.25 mg  Q6H  PRN


 IV PUSH  5/15/18 14:45


     


 


 


  (Proair Hfa Inh)  2 puff  Q4HR  PRN


 INH  5/15/18 17:30


     


 


 


  (Lipitor)  40 mg  HS


 PO  5/15/18 21:00


    5/15/18 20:43


 


 


  (Aricept)  5 mg  HS


 PO  5/15/18 21:00


    5/15/18 20:43


 


 


  (Hydrodiuril)  25 mg  DAILY


 PO  5/16/18 09:00


    5/16/18 08:23


 


 


  (Cozaar)  50 mg  DAILY


 PO  5/16/18 09:00


    5/16/18 08:23


 


 


  (Lopressor)  25 mg  BID


 PO  5/15/18 21:00


    5/16/18 08:24


 


 


  (Dilantin)  100 mg  BID


 PO  5/15/18 21:00


    5/16/18 08:23


 


 


  (Zoloft)  50 mg  DAILY


 PO  5/16/18 09:00


    5/16/18 08:22


 


 


  (Protonix)  40 mg  DAILY


 PO  5/16/18 09:00


    5/16/18 08:22


 


 


  (Apresoline)  50 mg  Q8HR


 PO  5/15/18 17:45


    5/16/18 05:20


 


 


  (Procardia Xl)  90 mg  DAILY


 PO  5/16/18 09:00


    5/16/18 08:23


 


 


  (Aspirin Chew)  81 mg  DAILY


 CHEW  5/16/18 09:00


    5/16/18 08:23


 











A/P


Assessment and Plan


78-year-old female with history of HTN, abdominal and thoracic aortic aneurysms

, COPD, GERD, seizure disorder, depression, presents with intermittent chest 

pains.





Unstable angina, possible NSTEMI; patient with atypical chest pains 2 weeks, 

suspect unstable angina, patient is female with borderline diabetes.  


   -With history of thoracic and abdominal aneurysms, checked aorta CTA which 

showed slight enlargement of a sending aortic aneurysm, however improved and 

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 

thorax, abdomen, iliac arteries, all slightly enlarged compared to previous 

exam in 2015 but no acute dissection.  


   -Patient reportedly has been evaluated at [facility] for her aneurysms, told she 

would likely not survive procedure


   -Continue on aspirin


   -Trended troponins, 0.02, 0.02, 0.30, 0.36 and EKG with new T-wave inversion


   -Cardiology consulted, appreciate recommendations





Thoracic/Abdominal/Iliac Aneurysms: chronic


   -Aorta CTA checked as above, shows gradual enlargements of aneurysms 

compared to 2015


   -Previously evaluated by [facility], not surgical candidate


   -Control BP





Accelerated hypertension:  With BP 180s/120s on admission. Question compliance 

with medications. 


   -Restart home blood pressure meds including metoprolol, losartan, HCTZ


   -Added procardia 90mg daily


   -Monitor BP, adjust antihypertensives as needed





Dementia: Chronic


   -continue donepezil.





Medication noncompliance: Secondary to dementia


   -Stressed importance of medications





Seizure disorder.  Chronic.


   -Continue phenytoin.





Depression: Chronic.  


   -Continue Zoloft.  


   -Will hold off on trazodone due to hypertension.





GERD: Chronic.  


   -Continue PPI.





Tobacco abuse: Chronic


   -Patient counseled on cessation.





Chronic ongoing weight loss: This could be secondary to dementia.  


   -Tobacco cessation could be helpful.  


   -Encourage p.o. intake.





Hyperlipidemia: chronic


   -continue patient's statin





DVT Prophylaxis: Mary Williamson PA-C May 16, 2018 9:42 am

## 2018-05-17 VITALS — HEART RATE: 88 BPM | DIASTOLIC BLOOD PRESSURE: 85 MMHG | SYSTOLIC BLOOD PRESSURE: 123 MMHG

## 2018-05-17 VITALS
HEART RATE: 85 BPM | RESPIRATION RATE: 18 BRPM | SYSTOLIC BLOOD PRESSURE: 118 MMHG | DIASTOLIC BLOOD PRESSURE: 82 MMHG | OXYGEN SATURATION: 95 % | TEMPERATURE: 98.5 F

## 2018-05-17 VITALS
OXYGEN SATURATION: 98 % | DIASTOLIC BLOOD PRESSURE: 75 MMHG | RESPIRATION RATE: 16 BRPM | SYSTOLIC BLOOD PRESSURE: 114 MMHG | HEART RATE: 81 BPM | TEMPERATURE: 98.6 F

## 2018-05-17 VITALS
OXYGEN SATURATION: 96 % | HEART RATE: 91 BPM | DIASTOLIC BLOOD PRESSURE: 72 MMHG | TEMPERATURE: 97.8 F | RESPIRATION RATE: 16 BRPM | SYSTOLIC BLOOD PRESSURE: 117 MMHG

## 2018-05-17 VITALS — HEART RATE: 79 BPM

## 2018-05-17 RX ADMIN — NIFEDIPINE SCH MG: 90 TABLET, FILM COATED, EXTENDED RELEASE ORAL at 07:57

## 2018-05-17 RX ADMIN — PHENYTOIN SODIUM SCH MG: 100 CAPSULE, EXTENDED RELEASE ORAL at 07:57

## 2018-05-17 RX ADMIN — PANTOPRAZOLE SCH MG: 40 TABLET, DELAYED RELEASE ORAL at 07:56

## 2018-05-17 RX ADMIN — LOSARTAN POTASSIUM SCH MG: 50 TABLET, FILM COATED ORAL at 07:56

## 2018-05-17 RX ADMIN — HYDROCHLOROTHIAZIDE SCH MG: 25 TABLET ORAL at 07:57

## 2018-05-17 RX ADMIN — SERTRALINE HYDROCHLORIDE SCH MG: 50 TABLET, FILM COATED ORAL at 07:56

## 2018-05-17 RX ADMIN — METOPROLOL TARTRATE SCH MG: 25 TABLET, FILM COATED ORAL at 07:56

## 2018-05-17 RX ADMIN — ASPIRIN 81 MG SCH MG: 81 TABLET ORAL at 07:57

## 2018-05-17 RX ADMIN — Medication SCH ML: at 07:58

## 2018-05-17 NOTE — HHI.FF
Face to Face Verification


Diagnosis:  


(1) Chest pain


(2) Accelerated hypertension


(3) Dementia


(4) Seizure disorder


(5) Thoracic aneurysm


(6) Hx of abdominal aortic aneurysm


Physical Therapy


Order:  Evaluate and Treat, Improve ambulation, Strength and gait training





Home Health Nursing








Order: Medical education





 Signs/symptoms of disease process





 Nursing assessment with vital signs

















I have seen patient Katey Blake on 5/17/18. My clinical findings support 

the need for the requested home health care services because:








 Deconditioned w/ increased weakness





 Med compliance is questionable





 Limited ability to care for self





 Impaired cognition/judgement














I certify that my clinical findings support that this patient is homebound 

because:








 Impaired cognitive ability/safety





 Unsteady gait/balance





 Unsafe to leave home unassisted





 Unable to use public transportation

















Mary Park PA-C May 17, 2018 07:30

## 2018-05-17 NOTE — HHI.PR
Subjective


Remarks


Follow up for chest pain, elevated troponin, uncontrolled hypertension. The 

patient reports feeling better today. Denies any chest pains or shortness of 

breath. She states her upper abdomen is just sore but overall improved. Denies 

any nausea/vomiting. She is tolerating oral intake. She is looking forward to 

going home today, agrees to [facility] arrangements. Denies any other medical 

complaints at this time.





Objective


Vitals





Vital Signs








  Date Time  Temp Pulse Resp B/P (MAP) Pulse Ox O2 Delivery O2 Flow Rate FiO2


 


5/17/18 08:26 97.8 91 16 107/68 (81) 96   





    117/72 (87)    


 


5/17/18 06:15  88  123/85 (98)    


 


5/17/18 03:31  79      


 


5/17/18 03:05 98.5 85 18 118/82 (94) 95   


 


5/16/18 23:06    110/75 (87)    


 


5/16/18 23:00 99.6 104 16 97/76 (83) 98   


 


5/16/18 14:03   18     


 


5/16/18 12:22 98.1 73 18 110/70 (83) 98   














I/O      


 


 5/16/18 5/16/18 5/16/18 5/17/18 5/17/18 5/17/18





 07:00 15:00 23:00 07:00 15:00 23:00


 


Intake Total 720 ml     


 


Balance 720 ml     


 


      


 


Intake Oral 720 ml     


 


# Voids 2     








Result Diagram:  


5/16/18 0202                                                                   

             5/16/18 0202





Imaging





Last Impressions








Chest X-Ray 5/15/18 1031 Signed





Impressions: 





 Service Date/Time:  Tuesday, May 15, 2018 11:10 - CONCLUSION: No acute 

disease.  





      Keanu Best MD 


 


Aorta CTA 5/15/18 1031 Signed





Impressions: 





 Service Date/Time:  Tuesday, May 15, 2018 12:25 - CONCLUSION:  1. The 





 examination demonstrates aneurysmal dilation of the ascending aorta aortic 

arch 





 and descending thoracic aorta. The aorta at its widest point in the arch 





 measures approximately 3.8 cm. This is only slightly larger than seen on 





 previous exam it should be noted on the prior exam there appears to have been 

an 





 acute thrombosis of a dissection flap. This has significantly improved in 





 appearance with partial healing. The proximal descending thoracic aorta is at 





 the upper limits of normal in caliber measuring 2.7 cm. 2. The distal thoracic 





 aorta demonstrates a focal area of contrast extending out into the mural 





 thrombus. This appears to be feeding the origin of an intercostal artery. 

There 





 was contrast in this area on the previous study of 2015 however, it appears 

more 





 organized and slightly larger on today's exam.  3. The distalmost portion of 





 thoracic aorta is aneurysmal. Maximum dimension is essentially at the 





 diaphragmatic hiatus. The aorta measures 5.4 centimeters at this level. This 

is 





 similar in size compared to previous. 4. The abdominal aorta is aneurysmal in 





 its distal segment measuring 5.6 cm. This has enlarged when compared to 





 previous. The aorta measured 3.7 cm at this location on the prior. 5. 

Aneurysmal 





 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 The right 





 measures approximately 2.5 cm. The iliac circulation is mildly enlarged 

compared 





 to previous as well. Maximum dimension on the left was 2.2 cm on the prior.   

  





 Riaz Wilde MD 








Objective Remarks


GENERAL: Well-nourished, well-developed thin female patient in NAD.


SKIN: Warm and dry. No rash.


HEENT:  Normocephalic. Atraumatic. Pupils equal and round.  Mucous membranes 

pink and moist.


NECK: Supple. Trachea midline.  


CARDIOVASCULAR: Regular rate and rhythm.  No murmur appreciated. 


RESPIRATORY: No accessory muscle use. Clear to auscultation. Breath sounds 

equal bilaterally.  


GASTROINTESTINAL: Abdomen soft, nondistended, nontender today.  Normoactive 

bowel sounds x4.  Pulsatile abdomen.


MUSCULOSKELETAL: No obvious deformities. Extremities without clubbing, cyanosis

, or edema. 


NEUROLOGICAL: Awake and alert. No obvious cranial nerve deficits.  Motor 

grossly within normal limits. Moving all extremities spontaneously. Normal 

speech.


PSYCHIATRIC: Appropriate mood and affect; insight and judgment normal.


Procedures


None.


Medications and IVs





Current Medications








 Medications


  (Trade)  Dose


 Ordered  Sig/Jamee


 Route  Start Time


 Stop Time Status Last Admin


 


  (NS Flush)  2 ml  UNSCH  PRN


 IVF  5/15/18 10:45


    5/15/18 10:41


 


 


  (NS Flush)  2 ml  UNSCH  PRN


 IV FLUSH  5/15/18 14:45


     


 


 


  (NS Flush)  2 ml  BID


 IV FLUSH  5/15/18 21:00


    5/17/18 07:58


 


 


  (Narcan Inj)  0.4 mg  UNSCH  PRN


 IV PUSH  5/15/18 14:45


     


 


 


  (Milk Of


 Magnesia Liq)  30 ml  Q12H  PRN


 PO  5/15/18 14:45


     


 


 


  (Senokot)  17.2 mg  Q12H  PRN


 PO  5/15/18 14:45


     


 


 


  (Dulcolax Supp)  10 mg  DAILY  PRN


 RECTAL  5/15/18 14:45


     


 


 


  (Lactulose Liq)  30 ml  DAILY  PRN


 PO  5/15/18 14:45


     


 


 


  (Vasotec Inj)  1.25 mg  Q6H  PRN


 IV PUSH  5/15/18 14:45


     


 


 


  (Proair Hfa Inh)  2 puff  Q4HR  PRN


 INH  5/15/18 17:30


     


 


 


  (Lipitor)  40 mg  HS


 PO  5/15/18 21:00


    5/16/18 23:08


 


 


  (Aricept)  5 mg  HS


 PO  5/15/18 21:00


    5/16/18 23:09


 


 


  (Hydrodiuril)  25 mg  DAILY


 PO  5/16/18 09:00


    5/17/18 07:57


 


 


  (Cozaar)  50 mg  DAILY


 PO  5/16/18 09:00


    5/17/18 07:56


 


 


  (Lopressor)  25 mg  BID


 PO  5/15/18 21:00


    5/17/18 07:56


 


 


  (Dilantin)  100 mg  BID


 PO  5/15/18 21:00


    5/17/18 07:57


 


 


  (Zoloft)  50 mg  DAILY


 PO  5/16/18 09:00


    5/17/18 07:56


 


 


  (Protonix)  40 mg  DAILY


 PO  5/16/18 09:00


    5/17/18 07:56


 


 


  (Apresoline)  50 mg  Q8HR


 PO  5/15/18 17:45


    5/17/18 06:15


 


 


  (Procardia Xl)  90 mg  DAILY


 PO  5/16/18 09:00


    5/17/18 07:57


 


 


  (Aspirin Chew)  81 mg  DAILY


 CHEW  5/16/18 09:00


    5/17/18 07:57


 


 


  (Morphine Inj)  2 mg  Q4H  PRN


 IV PUSH  5/16/18 11:45


     


 











A/P


Assessment and Plan


78-year-old female with history of HTN, abdominal and thoracic aortic aneurysms

, COPD, GERD, seizure disorder, depression, presents with intermittent chest 

pains.





Unstable angina, possible NSTEMI; patient with atypical chest pains 2 weeks, 

suspect unstable angina, patient is female with borderline diabetes.  


   -With history of thoracic and abdominal aneurysms, checked aorta CTA which 

showed slight enlargement of a sending aortic aneurysm, however improved and 

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 

thorax, abdomen, iliac arteries, all slightly enlarged compared to previous 

exam in 2015 but no acute dissection.  


   -Patient reportedly has been evaluated at [facility] for her aneurysms, told she 

would likely not survive procedure


   -Continue on aspirin


   -Trended troponins, 0.02, 0.02, 0.30, 0.36 and EKG with new T-wave inversion


   -Cardiology consulted, discussed with Dr. Stafford, recommends medical 

management and discharge planning with BP controlled. 





Thoracic/Abdominal/Iliac Aneurysms: chronic


   -Aorta CTA checked as above, shows gradual enlargements of aneurysms 

compared to 2015


   -Previously evaluated by [facility], not surgical candidate


   -Control BP, much improved





Accelerated hypertension:  With BP 180s/120s on admission. Question compliance 

with medications. 


   -Restart home blood pressure meds including metoprolol, losartan, HCTZ


   -Added procardia 90mg daily and hydralazine 50mg q8h


   -BP much improved, currently 100s/60s, stable for discharge





Dementia: Chronic


   -continue donepezil.





Medication noncompliance: Secondary to dementia


   -Stressed importance of medications





Seizure disorder.  Chronic.


   -Continue phenytoin.





Depression: Chronic.  


   -Continue Zoloft.  


   -Will hold off on trazodone due to hypertension.





GERD: Chronic.  


   -Continue PPI.





Tobacco abuse: Chronic


   -Patient counseled on cessation.





Chronic ongoing weight loss: This could be secondary to dementia.  


   -Tobacco cessation could be helpful.  


   -Encourage p.o. intake.





Hyperlipidemia: chronic


   -continue patient's statin





DVT Prophylaxis: SCDs


Discharge Planning


Discharge patient to home with [facility]


Condition on discharge: Stable


Heart Healthy Diet as tolerated


Ad Corenlia activity


Rx written: aspirin 81mg daily, hydralazine 50mg q8h, nifedipine ER 90mg daily, 

protonix 40mg daily


Follow-up with primary care physician within 2-3days, Follow up with cardiology 

in 1 week.











Mary Park PA-C May 17, 2018 08:30

## 2018-05-17 NOTE — HHI.DCPOC
Discharge Care Plan


Diagnosis:  


(1) Chest pain


(2) HTN (hypertension)


(3) Thoracic aneurysm


(4) Hx of abdominal aortic aneurysm


(5) Seizure disorder


(6) GERD (gastroesophageal reflux disease)


Goals to Promote Your Health


* To prevent worsening of your condition and complications


* To maintain your health at the optimal level


Directions to Meet Your Goals


*** Take your medications as prescribed


*** Follow your dietary instruction


*** Follow activity as directed








*** Keep your appointments as scheduled


*** Take your immunizations and boosters as scheduled


*** If your symptoms worsen call your PCP, if no PCP go to Urgent Care Center 

or Emergency Room***


*** Smoking is Dangerous to Your Health. Avoid second hand smoke***


***Call the 24-hour hour crisis hotline for domestic abuse at 1-651.624.2457***











Mary Park PA-C May 17, 2018 07:33

## 2018-05-20 ENCOUNTER — HOSPITAL ENCOUNTER (INPATIENT)
Dept: HOSPITAL 17 - NEPE | Age: 78
LOS: 2 days | Discharge: HOME HEALTH | DRG: 281 | End: 2018-05-22
Attending: HOSPITALIST | Admitting: HOSPITALIST
Payer: COMMERCIAL

## 2018-05-20 VITALS — HEIGHT: 65 IN | BODY MASS INDEX: 14.51 KG/M2 | WEIGHT: 87.08 LBS

## 2018-05-20 VITALS
DIASTOLIC BLOOD PRESSURE: 44 MMHG | OXYGEN SATURATION: 98 % | RESPIRATION RATE: 18 BRPM | HEART RATE: 91 BPM | TEMPERATURE: 98.3 F | SYSTOLIC BLOOD PRESSURE: 123 MMHG

## 2018-05-20 VITALS — SYSTOLIC BLOOD PRESSURE: 105 MMHG | DIASTOLIC BLOOD PRESSURE: 57 MMHG

## 2018-05-20 VITALS
RESPIRATION RATE: 25 BRPM | SYSTOLIC BLOOD PRESSURE: 98 MMHG | HEART RATE: 98 BPM | DIASTOLIC BLOOD PRESSURE: 53 MMHG | OXYGEN SATURATION: 99 %

## 2018-05-20 VITALS
OXYGEN SATURATION: 97 % | SYSTOLIC BLOOD PRESSURE: 98 MMHG | HEART RATE: 100 BPM | DIASTOLIC BLOOD PRESSURE: 53 MMHG | RESPIRATION RATE: 15 BRPM

## 2018-05-20 VITALS
HEART RATE: 101 BPM | SYSTOLIC BLOOD PRESSURE: 113 MMHG | OXYGEN SATURATION: 97 % | RESPIRATION RATE: 18 BRPM | TEMPERATURE: 97.6 F | DIASTOLIC BLOOD PRESSURE: 69 MMHG

## 2018-05-20 VITALS
RESPIRATION RATE: 23 BRPM | SYSTOLIC BLOOD PRESSURE: 81 MMHG | HEART RATE: 103 BPM | DIASTOLIC BLOOD PRESSURE: 53 MMHG | OXYGEN SATURATION: 99 % | TEMPERATURE: 97.9 F

## 2018-05-20 VITALS — HEART RATE: 92 BPM

## 2018-05-20 VITALS
HEART RATE: 100 BPM | TEMPERATURE: 97.9 F | OXYGEN SATURATION: 99 % | DIASTOLIC BLOOD PRESSURE: 53 MMHG | RESPIRATION RATE: 27 BRPM | SYSTOLIC BLOOD PRESSURE: 81 MMHG

## 2018-05-20 VITALS — HEART RATE: 93 BPM

## 2018-05-20 VITALS — HEART RATE: 74 BPM

## 2018-05-20 VITALS — HEART RATE: 70 BPM

## 2018-05-20 VITALS
HEART RATE: 94 BPM | RESPIRATION RATE: 19 BRPM | DIASTOLIC BLOOD PRESSURE: 57 MMHG | OXYGEN SATURATION: 98 % | SYSTOLIC BLOOD PRESSURE: 105 MMHG

## 2018-05-20 VITALS
SYSTOLIC BLOOD PRESSURE: 101 MMHG | OXYGEN SATURATION: 97 % | DIASTOLIC BLOOD PRESSURE: 63 MMHG | HEART RATE: 98 BPM | RESPIRATION RATE: 23 BRPM

## 2018-05-20 VITALS — HEART RATE: 81 BPM

## 2018-05-20 VITALS — HEART RATE: 84 BPM

## 2018-05-20 VITALS — SYSTOLIC BLOOD PRESSURE: 107 MMHG | DIASTOLIC BLOOD PRESSURE: 57 MMHG

## 2018-05-20 VITALS — OXYGEN SATURATION: 97 %

## 2018-05-20 DIAGNOSIS — F17.210: ICD-10-CM

## 2018-05-20 DIAGNOSIS — I71.2: ICD-10-CM

## 2018-05-20 DIAGNOSIS — I71.4: ICD-10-CM

## 2018-05-20 DIAGNOSIS — G40.909: ICD-10-CM

## 2018-05-20 DIAGNOSIS — F32.9: ICD-10-CM

## 2018-05-20 DIAGNOSIS — D69.6: ICD-10-CM

## 2018-05-20 DIAGNOSIS — K21.9: ICD-10-CM

## 2018-05-20 DIAGNOSIS — N17.9: ICD-10-CM

## 2018-05-20 DIAGNOSIS — I21.4: Primary | ICD-10-CM

## 2018-05-20 DIAGNOSIS — E78.5: ICD-10-CM

## 2018-05-20 DIAGNOSIS — I25.10: ICD-10-CM

## 2018-05-20 DIAGNOSIS — N18.3: ICD-10-CM

## 2018-05-20 DIAGNOSIS — I08.1: ICD-10-CM

## 2018-05-20 DIAGNOSIS — R73.03: ICD-10-CM

## 2018-05-20 DIAGNOSIS — I13.10: ICD-10-CM

## 2018-05-20 DIAGNOSIS — F03.90: ICD-10-CM

## 2018-05-20 DIAGNOSIS — R63.4: ICD-10-CM

## 2018-05-20 DIAGNOSIS — J44.9: ICD-10-CM

## 2018-05-20 LAB
ALBUMIN SERPL-MCNC: 3 GM/DL (ref 3.4–5)
ALP SERPL-CCNC: 154 U/L (ref 45–117)
ALT SERPL-CCNC: 17 U/L (ref 10–53)
AST SERPL-CCNC: 31 U/L (ref 15–37)
BASOPHILS # BLD AUTO: 0 TH/MM3 (ref 0–0.2)
BASOPHILS NFR BLD: 0.6 % (ref 0–2)
BILIRUB SERPL-MCNC: 0.3 MG/DL (ref 0.2–1)
BUN SERPL-MCNC: 19 MG/DL (ref 7–18)
CALCIUM SERPL-MCNC: 8.4 MG/DL (ref 8.5–10.1)
CHLORIDE SERPL-SCNC: 100 MEQ/L (ref 98–107)
CREAT SERPL-MCNC: 1.08 MG/DL (ref 0.5–1)
EOSINOPHIL # BLD: 0 TH/MM3 (ref 0–0.4)
EOSINOPHIL NFR BLD: 0.5 % (ref 0–4)
ERYTHROCYTE [DISTWIDTH] IN BLOOD BY AUTOMATED COUNT: 13.6 % (ref 11.6–17.2)
GFR SERPLBLD BASED ON 1.73 SQ M-ARVRAT: 60 ML/MIN (ref 89–?)
GLUCOSE SERPL-MCNC: 101 MG/DL (ref 74–106)
HCO3 BLD-SCNC: 26.4 MEQ/L (ref 21–32)
HCT VFR BLD CALC: 41.2 % (ref 35–46)
HGB BLD-MCNC: 13.7 GM/DL (ref 11.6–15.3)
INR PPP: 1.2 RATIO
LYMPHOCYTES # BLD AUTO: 1.5 TH/MM3 (ref 1–4.8)
LYMPHOCYTES NFR BLD AUTO: 28.1 % (ref 9–44)
MCH RBC QN AUTO: 32.7 PG (ref 27–34)
MCHC RBC AUTO-ENTMCNC: 33.4 % (ref 32–36)
MCV RBC AUTO: 97.8 FL (ref 80–100)
MONOCYTE #: 0.7 TH/MM3 (ref 0–0.9)
MONOCYTES NFR BLD: 12.6 % (ref 0–8)
NEUTROPHILS # BLD AUTO: 3 TH/MM3 (ref 1.8–7.7)
NEUTROPHILS NFR BLD AUTO: 58.2 % (ref 16–70)
PLATELET # BLD: 109 TH/MM3 (ref 150–450)
PMV BLD AUTO: 9.9 FL (ref 7–11)
PROT SERPL-MCNC: 6.7 GM/DL (ref 6.4–8.2)
PROTHROMBIN TIME: 12 SEC (ref 9.8–11.6)
RBC # BLD AUTO: 4.21 MIL/MM3 (ref 4–5.3)
SODIUM SERPL-SCNC: 136 MEQ/L (ref 136–145)
TROPONIN I SERPL-MCNC: 0.45 NG/ML (ref 0.02–0.05)
TROPONIN I SERPL-MCNC: 0.59 NG/ML (ref 0.02–0.05)
WBC # BLD AUTO: 5.2 TH/MM3 (ref 4–11)

## 2018-05-20 PROCEDURE — 99152 MOD SED SAME PHYS/QHP 5/>YRS: CPT

## 2018-05-20 PROCEDURE — 84484 ASSAY OF TROPONIN QUANT: CPT

## 2018-05-20 PROCEDURE — 74174 CTA ABD&PLVS W/CONTRAST: CPT

## 2018-05-20 PROCEDURE — 93567 NJX CAR CTH SPRVLV AORTGRPHY: CPT

## 2018-05-20 PROCEDURE — 99153 MOD SED SAME PHYS/QHP EA: CPT

## 2018-05-20 PROCEDURE — 85018 HEMOGLOBIN: CPT

## 2018-05-20 PROCEDURE — 80048 BASIC METABOLIC PNL TOTAL CA: CPT

## 2018-05-20 PROCEDURE — 93458 L HRT ARTERY/VENTRICLE ANGIO: CPT

## 2018-05-20 PROCEDURE — 82550 ASSAY OF CK (CPK): CPT

## 2018-05-20 PROCEDURE — 86901 BLOOD TYPING SEROLOGIC RH(D): CPT

## 2018-05-20 PROCEDURE — 82552 ASSAY OF CPK IN BLOOD: CPT

## 2018-05-20 PROCEDURE — 71045 X-RAY EXAM CHEST 1 VIEW: CPT

## 2018-05-20 PROCEDURE — 85025 COMPLETE CBC W/AUTO DIFF WBC: CPT

## 2018-05-20 PROCEDURE — 85014 HEMATOCRIT: CPT

## 2018-05-20 PROCEDURE — 85002 BLEEDING TIME TEST: CPT

## 2018-05-20 PROCEDURE — 71275 CT ANGIOGRAPHY CHEST: CPT

## 2018-05-20 PROCEDURE — 80053 COMPREHEN METABOLIC PANEL: CPT

## 2018-05-20 PROCEDURE — 93005 ELECTROCARDIOGRAM TRACING: CPT

## 2018-05-20 PROCEDURE — 86900 BLOOD TYPING SEROLOGIC ABO: CPT

## 2018-05-20 PROCEDURE — 85610 PROTHROMBIN TIME: CPT

## 2018-05-20 PROCEDURE — C1769 GUIDE WIRE: HCPCS

## 2018-05-20 PROCEDURE — C1893 INTRO/SHEATH, FIXED,NON-PEEL: HCPCS

## 2018-05-20 PROCEDURE — 86850 RBC ANTIBODY SCREEN: CPT

## 2018-05-20 PROCEDURE — 85730 THROMBOPLASTIN TIME PARTIAL: CPT

## 2018-05-20 RX ADMIN — MORPHINE SULFATE PRN MG: 2 INJECTION, SOLUTION INTRAMUSCULAR; INTRAVENOUS at 20:20

## 2018-05-20 RX ADMIN — PHENYTOIN SODIUM SCH MLS/HR: 50 INJECTION INTRAMUSCULAR; INTRAVENOUS at 15:48

## 2018-05-20 RX ADMIN — MORPHINE SULFATE PRN MG: 2 INJECTION, SOLUTION INTRAMUSCULAR; INTRAVENOUS at 20:58

## 2018-05-20 RX ADMIN — PHENYTOIN SODIUM SCH MG: 100 CAPSULE, EXTENDED RELEASE ORAL at 20:18

## 2018-05-20 RX ADMIN — MORPHINE SULFATE PRN MG: 2 INJECTION, SOLUTION INTRAMUSCULAR; INTRAVENOUS at 16:24

## 2018-05-20 RX ADMIN — ATORVASTATIN CALCIUM SCH MG: 40 TABLET, FILM COATED ORAL at 20:19

## 2018-05-20 RX ADMIN — Medication SCH ML: at 20:18

## 2018-05-20 RX ADMIN — DONEPEZIL HYDROCHLORIDE SCH MG: 5 TABLET, FILM COATED ORAL at 20:19

## 2018-05-20 NOTE — EKG
Date Performed: 05/20/2018       Time Performed: 12:58:29

 

PTAGE:      78 years

 

EKG:      Sinus rhythm 

 

 BI-ATRIAL ENLARGEMENT POSSIBLE RIGHT VENTRICULAR CONDUCTION DELAY POSSIBLE LEFT VENTRICULAR HYPERTRO
PHY ST DEVIATION AND MODERATE T-WAVE ABNORMALITY, CONSIDER ANTEROLATERAL ISCHEMIA ABNORMAL ECG 

 

NO PREVIOUS TRACING            

 

DOCTOR:   Paco Calvert  Interpretating Date/Time  05/20/2018 17:49:55

## 2018-05-20 NOTE — RADRPT
EXAM DATE/TIME:  05/20/2018 10:03 

 

[facility] COMPARISON:     

CTA THORACIC ABDOMINAL AORTA W 3D RECON, May 15, 2018, 12:25.

 

 

INDICATIONS :     

Chest pain today.

                           

 

IV CONTRAST:     

96 cc Omnipaque 350 (iohexol) IV 

 

 

RADIATION DOSE:     

2.28 CTDIvol (mGy) 

 

 

MEDICAL HISTORY :     

Cardiovascular disease. Hypertension. Seizures.diabetes

 

SURGICAL HISTORY :      

Hysterectomy. Cholecystectomy.

 

ENCOUNTER:      

Initial

 

ACUITY:      

1 day

 

PAIN SCALE:      

7/10

 

LOCATION:       

Bilateral chest 

 

TECHNIQUE:     

Volumetric scanning was performed using a multi-row detector CT scanner.  The data was post processed
 with a variety of visualization algorithms including full volume maximum intensity projection, multi
-planar sliding thin slab reformation, curved planar reformation, and surface rendering techniques.  
Using automated exposure control and adjustment of the mA and/or kV according to patient size, radiat
ion dose was kept as low as reasonably achievable to obtain optimal diagnostic quality images.  DICOM
 format image data is available electronically for review and comparison.  

 

FINDINGS:     

Comparison is made to CTA performed 5/15/18.  The prior CTA had demonstrated aneurysmal dilatation of
 the ascending aorta and descending aorta with partial healing of an aneurysmal dissection flap desce
nding aorta, contrast extension into mural thrombus of the distal thoracic aorta, abdominal aortic an
eurysm measuring up to 5.6 cm, an aneurysmal dilatation of the bilateral iliac arteries, left greater
 than right.  Absent flow in the left iliac aneurysm with reconstitution of flow at the level of the 
superficial femoral.  There has been no significant interval change in these multiple findings when c
ompared to the prior CTA..

 

CONCLUSION:     

No significant change in the thoracic aortic, abdominal aortic, and iliac artery aneurysms with mural
 thrombus and dissection flap when compared to CTA performed 5 days ago.  Please refer to the report 
of the prior CTA for a detailed description of the findings.     

 

 

 

 Edmundo Allen MD on May 20, 2018 at 10:42           

Board Certified Radiologist.

 This report was verified electronically.

## 2018-05-20 NOTE — PD
HPI


Chief Complaint:  Chest Pain


Time Seen by Provider:  09:19


Travel History


International Travel<30 days:  No


Contact w/Intl Traveler<30days:  No


Traveled to known affect area:  No





History of Present Illness


HPI


Patient is a 78-year-old female who comes in complaining in.  She was here a 

few days ago, discharged home, after being evaluated for an elevated troponin.  

She says she was feeling better until last night when the chest pain started 

again.  She does have history of thoracic and abdominal aortic aneurysm.  She 

was told by several surgeons that the aneurysm is inoperable.  She says the 

pain is substernal and goes down into her abdomen.  She also complains of 

paresthesias in her left foot.  She denies any shortness of breath.  She denies 

any cough or cold symptoms.  Severity is moderate.





PFSH


Past Medical History


Hx Anticoagulant Therapy:  No


Arthritis:  Yes


Asthma:  No


Autoimmune Disease:  No


Blood Disorders:  No


Anxiety:  Yes


Depression:  No


Heart Rhythm Problems:  Yes


Cancer:  No


Cardiac Catheterization:  No


Cardiovascular Problems:  Yes


High Cholesterol:  Yes


Chemotherapy:  No


Chest Pain:  Yes


Congestive Heart Failure:  No


COPD:  Yes


Cerebrovascular Accident:  No


Coronary Artery Disease:  Yes


Dementia:  Yes


Diabetes:  Yes (borderline)


Diminished Hearing:  Yes


Endocrine:  Yes


Gastrointestinal Disorders:  Yes


GERD:  No


Glaucoma:  No


Genitourinary:  No


Headaches:  No


Hepatitis:  No


Hiatal Hernia:  No


Hypertension:  Yes


Immune Disorder:  No


Implanted Vascular Access Dvce:  No


Kidney Stones:  No


Musculoskeletal:  Yes


Neurologic:  Yes (aneurysm)


Psychiatric:  No


Reproductive:  No


Respiratory:  Yes (emphysema)


Migraines:  No


Myocardial Infarction:  Yes


Pneumonia:  Yes


Radiation Therapy:  No


Renal Failure:  Yes (CKD 3, per medical record from DR office)


Seizures:  Yes


Sickle Cell Disease:  No


Sleep Apnea:  No


Thyroid Disease:  Yes


Ulcer:  No


PNEUMOCCOCAL Vaccine (Year):  1


Pregnant?:  Not Pregnant


Menopausal:  Yes


:  2


Para:  2





Past Surgical History


Abdominal Surgery:  Yes


AICD:  No


Appendectomy:  No


Arteriovenous Shunt:  No


Cardiac Surgery:  Yes (aneurysm)


Cholecystectomy:  Yes


Coronary Artery Bypass Graft:  No


Ear Surgery:  No


Endocrine Surgery:  No


Eye Surgery:  No


Genitourinary Surgery:  No


Gynecologic Surgery:  Yes (hysterectomy)


Hysterectomy:  Yes


Insulin Pump:  No


Joint Replacement:  No


Neurologic Surgery:  Yes (BRAIN SURGERY DUE TO ANEURYSM)


Oral Surgery:  No


Pacemaker:  No


Thoracic Surgery:  No


Other Surgery:  Yes





Social History


Alcohol Use:  No


Tobacco Use:  No (quit smoking a week ago)


Substance Use:  No





Allergies-Medications


(Allergen,Severity, Reaction):  


Coded Allergies:  


     *MDRO Multi-Drug Resistant Organism (Verified  Allergy, Unknown, 5/15/18)


 C-diff 2014


     acetaminophen (Verified  Allergy, Unknown, 5/15/18)


     codeine (Verified  Allergy, Unknown, 5/15/18)


     MRI PRECAUTION (Verified  Adverse Reaction, Severe, ANEURYSM CLIP-(JLT)   DR. MERCADO, 5/15/18)


Reported Meds & Prescriptions





Reported Meds & Active Scripts


Active


Pantoprazole (Pantoprazole Sodium) 40 Mg Tab 40 Mg PO DAILY 30 Days


Nifedipine ER (Nifedipine) 90 Mg Tab 90 Mg PO DAILY 30 Days


Hydralazine HCl 50 Mg Tablet 50 Mg PO Q8HR 30 Days


Reported


Vitamin B Complex-C (B Complex W/ C) 1 Cap Cap   


Atorvastatin (Atorvastatin Calcium) 40 Mg Tab 40 Mg PO HS


Decara (Cholecalciferol) 50,000 Unit Cap 50,000 Units PO Q7D


Trazodone (Trazodone HCl) 50 Mg Tab 2 Tab PO HS


Metoprolol Tartrate 25 Mg Tab 25 Mg PO BID


Hydrochlorothiazide 25 Mg Tab 25 Mg PO DAILY


Phenytoin Extended 100 Mg Cap 100 Mg PO BID


Ventolin Hfa 18 GM Inh (Albuterol Sulfate) 90 Mcg/Act Aer 2 Puff INH Q4-6H PRN


Sertraline (Sertraline HCl) 50 Mg Tab 50 Mg PO DAILY


Losartan (Losartan Potassium) 50 Mg Tab 50 Mg PO DAILY


Donepezil 5 Mg Tab 5 Mg PO HS








Review of Systems


Except as stated in HPI:  all other systems reviewed are Neg


General / Constitutional:  No: Fever, Chills


HENT:  No: Headaches, Lightheadedness


Cardiovascular:  Positive: Chest Pain or Discomfort


Respiratory:  No: Shortness of Breath


Gastrointestinal:  Positive: Nausea, Vomiting, Abdominal Pain


Musculoskeletal:  No: Myalgias, Edema


Neurologic:  Positive: Paresthesia





Physical Exam


Narrative


GENERAL: Awake and alert, in no acute distress. 


SKIN: Focused skin assessment warm/dry. No wounds or signs of infection. 


HEAD: Atraumatic. Normocephalic. 


EYES: Pupils equal and round. No scleral icterus. No injection or drainage. 


ENT: Mucous membranes pink and moist.


NECK: Trachea midline. No JVD. 


CARDIOVASCULAR: Regular rate and rhythm.  No murmur appreciated.


RESPIRATORY: No accessory muscle use. Clear to auscultation. Breath sounds 

equal bilaterally. 


GASTROINTESTINAL: Abdomen soft, non-tender, nondistended. 


MUSCULOSKELETAL: No obvious deformities. No clubbing.  No cyanosis.  No edema. 

Pedal pulses palpable. 


NEUROLOGICAL: Awake and alert. No obvious cranial nerve deficits.  Motor 

grossly within normal limits. Normal speech.


PSYCHIATRIC: Appropriate mood and affect; insight and judgment normal.





Data


Data


Last Documented VS





Vital Signs








  Date Time  Temp Pulse Resp B/P (MAP) Pulse Ox O2 Delivery O2 Flow Rate FiO2


 


18 11:07  98 23 101/63 (76) 97 Room Air  


 


18 09:25 97.9       








Orders





 Orders


Electrocardiogram (18:20)


Ckmb (Isoenzyme) Profile (18 09:20)


Complete Blood Count With Diff (18 09:20)


Comprehensive Metabolic Panel (18:20)


Prothrombin Time / Inr (Pt) (18:20)


Act Partial Throm Time (Ptt) (18 09:20)


Troponin I (18 09:20)


Chest, Single Ap (18 09:20)


Ecg Monitoring (18 09:20)


Bilateral Bp Monitoring (18 09:20)


Iv Access Insert/Monitor (18 09:20)


Oximetry (18 09:20)


Oxygen Administration (18 09:20)


Sodium Chloride 0.9% Flush (Ns Flush) (18 09:30)


Sodium Chlorid 0.9% 500 Ml Inj (Ns 500 M (18 09:30)


Ondansetron  Odt (Zofran  Odt) (18 10:00)


Cta Thor Abd Aorta W Iv C W3d (18 10:01)


Type And Screen (18 10:20)


Iohexol 350 Inj (Omnipaque 350 Inj) (18 10:30)


CKMB (18 10:43)


CKMB% (18 10:43)


Heparin Inj (Heparin Inj) (18 18:00)


Heparin Inj (Heparin Inj) (18 18:00)


Heparin-D5w 25,000 U/250 Ml (Heparin-D5w (18 12:00)


Cbc No Diff, Includes Plts (18 06:00)


Act Partial Throm Time (Ptt) (18 18:59)


Occult Blood (Hemoccult) Stool (18 11:59)


Aspirin Chew (Aspirin Chew) (18 12:00)


Consult Vascular Surgery (18 )


(Hub Use Only)Inp Phy Cons/Ref (18 )


Heparin-D5w 25,000 U/250 Ml (Heparin-D5w (18 12:30)


Electrocardiogram (18 )


Troponin I (18 12:40)


Admit Order (Ed Use Only) (18 )





Labs





Laboratory Tests








Test


  18


09:25 18


10:34 18


10:43 18


12:41


 


White Blood Count 5.2 TH/MM3    


 


Red Blood Count 4.21 MIL/MM3    


 


Hemoglobin 13.7 GM/DL    


 


Hematocrit 41.2 %    


 


Mean Corpuscular Volume 97.8 FL    


 


Mean Corpuscular Hemoglobin 32.7 PG    


 


Mean Corpuscular Hemoglobin


Concent 33.4 % 


  


  


  


 


 


Red Cell Distribution Width 13.6 %    


 


Platelet Count 109 TH/MM3    


 


Mean Platelet Volume 9.9 FL    


 


Neutrophils (%) (Auto) 58.2 %    


 


Lymphocytes (%) (Auto) 28.1 %    


 


Monocytes (%) (Auto) 12.6 %    


 


Eosinophils (%) (Auto) 0.5 %    


 


Basophils (%) (Auto) 0.6 %    


 


Neutrophils # (Auto) 3.0 TH/MM3    


 


Lymphocytes # (Auto) 1.5 TH/MM3    


 


Monocytes # (Auto) 0.7 TH/MM3    


 


Eosinophils # (Auto) 0.0 TH/MM3    


 


Basophils # (Auto) 0.0 TH/MM3    


 


CBC Comment DIFF FINAL    


 


Differential Comment     


 


Prothrombin Time  12.0 SEC   


 


Prothromb Time International


Ratio 


  1.2 RATIO 


  


  


 


 


Activated Partial


Thromboplast Time 


  25.1 SEC 


  


  


 


 


Blood Urea Nitrogen   19 MG/DL  


 


Creatinine   1.08 MG/DL  


 


Random Glucose   101 MG/DL  


 


Total Protein   6.7 GM/DL  


 


Albumin   3.0 GM/DL  


 


Calcium Level   8.4 MG/DL  


 


Alkaline Phosphatase   154 U/L  


 


Aspartate Amino Transf


(AST/SGOT) 


  


  31 U/L 


  


 


 


Alanine Aminotransferase


(ALT/SGPT) 


  


  17 U/L 


  


 


 


Total Bilirubin   0.3 MG/DL  


 


Sodium Level   136 MEQ/L  


 


Potassium Level   3.5 MEQ/L  


 


Chloride Level   100 MEQ/L  


 


Carbon Dioxide Level   26.4 MEQ/L  


 


Anion Gap   10 MEQ/L  


 


Estimat Glomerular Filtration


Rate 


  


  60 ML/MIN 


  


 


 


Total Creatine Kinase   126 U/L  


 


Creatine Kinase MB   8.0 NG/ML  


 


Troponin I   0.45 NG/ML  0.48 NG/ML 











MDM


Medical Decision Making


Medical Screen Exam Complete:  Yes


Emergency Medical Condition:  Yes


Medical Record Reviewed:  Yes


Interpretation(s)


ECG shows normal sinus rhythm, ST depression in the lateral leads, unchanged 

from previous.


Differential Diagnosis


ACS versus NSTEMI versus STEMI versus aortic dissection


Narrative Course


Patient is a 78-year-old female comes in complaining of chest pain with 

abdominal pain and nausea and vomiting.  On arrival, she was hypotensive.  

Given a bolus of 500 mL's of fluid.  IV established, labs sent.  Patient taken 

for repeat scan of her aorta.  The scan is unchanged from her previous.


Last 24 hours Impressions








Aorta CTA 18 1001 Signed





Impressions: 





 Service Date/Time:  Morgan, May 20, 2018 10:03 - CONCLUSION:  No significant 





 change in the thoracic aortic, abdominal aortic, and iliac artery aneurysms 

with 





 mural thrombus and dissection flap when compared to CTA performed 5 days ago.  





 Please refer to the report of the prior CTA for a detailed description of the 





 findings.          Edmundo Allen MD 


 


Chest X-Ray 18 0920 Signed





Impressions: 





 Service Date/Time:  Morgan, May 20, 2018 10:19 - CONCLUSION:  No acute 

findings 





 in the chest.  No evidence of pleural effusion or pneumothorax.     Edmundo Allen MD 





Troponin is 0.45.  This is elevated from a few days ago.





I spoke with Dr. Hair of cardiology, he suggests anticoagulation.  I spoke 

with Dr. Fry who is comfortable with heparin in the setting of her aortic 

aneurysm.





Patient given aspirin and started on heparin.  She will be admitted for further 

management.





Diagnosis





 Primary Impression:  


 NSTEMI (non-ST elevated myocardial infarction)





Admitting Information


Admitting Physician Requests:  Admit











Siena White MD May 20, 2018 12:50

## 2018-05-20 NOTE — RADRPT
EXAM DATE/TIME:  05/20/2018 10:19 

 

[facility] COMPARISON:     

CHEST SINGLE AP, May 15, 2018, 11:10.

 

                     

INDICATIONS :     

Chest pain. 

                     

 

MEDICAL HISTORY :            

Dementia. Cardiovascular disease Hypertension.Abdominal aneurysm   

 

SURGICAL HISTORY :     

Hysterectomy.   

 

ENCOUNTER:     

Initial                                        

 

ACUITY:     

1 day      

 

PAIN SCORE:     

5/10

 

LOCATION:     

Bilateral chest 

 

FINDINGS:     

The lungs are symmetrically aerated and clear, unchanged in configuration from prior exam.  The heart
 is stable in configuration.  Both hemidiaphragms are well delineated.  In configuration to the conto
ur abnormality in the left lower chest characteristic of thoracic aortic aneurysm.  No evidence of pn
eumothorax.

 

CONCLUSION:     

No acute findings in the chest.  No evidence of pleural effusion or pneumothorax.

 

 

 

 Edmundo Allen MD on May 20, 2018 at 11:20           

Board Certified Radiologist.

 This report was verified electronically.

## 2018-05-20 NOTE — EKG
Date Performed: 05/20/2018       Time Performed: 09:07:22

 

PTAGE:      78 years

 

EKG:      SINUS TACHYCARDIA WITH SHORT KS INTERVAL LEFT ATRIAL ENLARGEMENT POSSIBLE RIGHT VENTRICULAR
 CONDUCTION DELAY POSSIBLE LEFT VENTRICULAR HYPERTROPHY ST DEVIATION AND MODERATE T-WAVE ABNORMALITY,
 CONSIDER ANTEROLATERAL ISCHEMIA TYPE 3 BRUGADA PATTERN (NON-DIAGNOSTIC) ABNORMAL ECG 

 

NO PREVIOUS TRACING            

 

DOCTOR:   Philip Lau  Interpretating Date/Time  05/20/2018 20:42:25

## 2018-05-20 NOTE — MB
cc:

Radha Fry MD

****

 

 

DATE:

05/20/2018

 

REASON FOR CONSULTATION:

Thoracic aortic aneurysm.

 

Critical care time 32 minutes.

 

HISTORY OF PRESENT ILLNESS:

This 78-year-old female with past history of smoking, COPD, coronary 

artery disease, who presents to the hospital with a complaint of chest

pain.  The patient was here with elevated troponins, seen by the 

cardiologist, now being admitted.  In the process of workup, she is 

noted to have a thoracic aortic aneurysm and question arises if this 

has any bearing to the planned therapy for the heart problems.

 

PAST MEDICAL HISTORY:

Abdominal and thoracic aneurysms, seizures, depression, COPD, 

hypertension.

 

PAST SURGICAL HISTORY:

Aneurysm clipping about year 2000, hysterectomy and forearm surgery.

 

MEDICATIONS:

Can be found in the record.

 

SOCIAL HISTORY:

The patient still smokes, does not use drugs and does not drink.

 

PHYSICAL EXAMINATION:

GENERAL:  Reveals a fairly thin 78-year-old lady, in no acute 

distress.

HEENT:  Normocephalic.  No trauma to the head.  Pupils are equal and 

reactive, and extraocular muscles appear to be intact.

NECK:  Bilateral carotid pulses.  No bruits.

CHEST:  The patient has bilateral breath sounds, but these are 

decreased consistent with severe COPD.  A fair amount of loss of 

musculature and pulmonary cachexia.

HEART:  Regular rate and rhythm.

ABDOMEN:  Soft.  No rebound, no guarding, no masses.

EXTREMITIES:  Atrophic atrophy of all 4 extremities.  The  patient has

bilateral femoral, popliteal, dorsalis pedis and posterior tibial 

pulses.  No signs of acute vascular deficit.

NEUROLOGIC:  She is grossly intact.

 

IMPRESSION AND RECOMMENDATIONS:

I reviewed laboratory and diagnostic procedure.  This patient is in 

the process of a cardiac workup.  The CT of the chest reveals 

ascending aortic aneurysm and healed dissection with a thrombus in the

thoracic aorta and then abdominal aortic aneurysm about 6 cm in 

diameter with dilatation also of iliac arteries lower down.  The 

patient is not a candidate for either abdominal or thoracic aortic 

aneurysm repair by endovascular or open means.  She has been evaluated

at [facility] for the same since and I agree with the crew up there, 

patient can be safely anticoagulated and cardiac issue should be 

treated regardless of this aneurysm.

 

I thank you much for this referral.

 

 

__________________________________

SloMD TRUMAN Robles/KIMI

D: 05/20/2018, 03:27 PM

T: 05/20/2018, 03:53 PM

Visit #: [id number]

Job #: [id number]

## 2018-05-20 NOTE — HHI.HP
__________________________________________________





HPI


Service


[facility]


Primary Care Physician


Unknown


Admission Diagnosis





NSTEMI


Diagnoses:  


Chief Complaint:  


Chest pain


Travel History


International Travel<30 Days:  No


Contact w/Intl Traveler <30 Da:  No


Traveled to Known Affected Are:  No


History of Present Illness


This is a 78-year-old female with past medical history significant for COPD, 

smoking, CAD, history of thoracic and aortic aneurysm who presents to [facility] complaining of chest pain.  The patient had been recently 

discharged from this institution on 2018 after being evaluated for chest 

pain and elevated troponins.  At the time the patient was seen by Dr. Stafford 

who thought that possibly elevated troponin level was secondary to 

subendocardial ischemia.  At the time the toe was discussed with the patient 

and given the option of medical therapy, SPECT or cardiac catheterization.  As 

per medical records medical therapy was chosen.





The patient presents with complaints of chest pain, substernal, rated as 10/10 

intensity, nonradiating started yesterday morning and continue through the 

night.  Denies palpitations, denies diaphoresis.  The patient complains of 

nausea and vomiting several times.  The patient also complains of periumbilical 

pain which is 9/10 intensity.  Patient states there is no aggravating or 

alleviating factor.





Review of Systems


As per HPI, other systems reviewed by me negative.





Past Family Social History


Past Medical History


Hypertension


COPD


Abdominal and thoracic aortic aneurysm


Seizure disorder


Depression


GERD


Past Surgical History


Left forearm surgery


Brain surgery for aneurysm over 15 years ago.


Hysterectomy


Reported Medications





Reported Meds & Active Scripts


Active


Pantoprazole (Pantoprazole Sodium) 40 Mg Tab 40 Mg PO DAILY 30 Days


Nifedipine ER (Nifedipine) 90 Mg Tab 90 Mg PO DAILY 30 Days


Hydralazine HCl 50 Mg Tablet 50 Mg PO Q8HR 30 Days


Reported


Vitamin B Complex-C (B Complex W/ C) 1 Cap Cap   


Atorvastatin (Atorvastatin Calcium) 40 Mg Tab 40 Mg PO HS


Decara (Cholecalciferol) 50,000 Unit Cap 50,000 Units PO Q7D


Trazodone (Trazodone HCl) 50 Mg Tab 2 Tab PO HS


Metoprolol Tartrate 25 Mg Tab 25 Mg PO BID


Hydrochlorothiazide 25 Mg Tab 25 Mg PO DAILY


Phenytoin Extended 100 Mg Cap 100 Mg PO BID


Ventolin Hfa 18 GM Inh (Albuterol Sulfate) 90 Mcg/Act Aer 2 Puff INH Q4-6H PRN


Sertraline (Sertraline HCl) 50 Mg Tab 50 Mg PO DAILY


Losartan (Losartan Potassium) 50 Mg Tab 50 Mg PO DAILY


Donepezil 5 Mg Tab 5 Mg PO HS


Allergies:  


Coded Allergies:  


     *MDRO Multi-Drug Resistant Organism (Verified  Allergy, Unknown, 5/15/18)


 C-diff 2014


     acetaminophen (Verified  Allergy, Unknown, 5/15/18)


     codeine (Verified  Allergy, Unknown, 5/15/18)


     MRI PRECAUTION (Verified  Adverse Reaction, Severe, ANEURYSM CLIP-(JLT)   DR. MERCADO, 5/15/18)


Active Ordered Medications





Current Medications








 Medications


  (Trade)  Dose


 Ordered  Sig/Jamee


 Route  Start Time


 Stop Time Status Last Admin


 


  (NS Flush)  2 ml  UNSCH  PRN


 IVF  18 09:30


     


 


 


  (Heparin Inj)  5,000 units  UNSCH  PRN


 IV PUSH  18 18:00


     


 


 


  (Heparin Inj)  2,500 units  UNSCH  PRN


 IV PUSH  18 18:00


     


 


 


 Heparin Sodium/


 Dextrose  250 ml @ 


 4.8 mls/hr  TITRATE  PRN


 IV  18 12:30


    18 12:38


 








Family History


Patient says father had a stroke, mother had a heart problem


Social History


Patient reports that she smokes 2 cigarettes per day, and has smoked for the 

past 50 years.  Denies any alcohol or illicit drugs.





Physical Exam


Vital Signs





Vital Signs








  Date Time  Temp Pulse Resp B/P (MAP) Pulse Ox O2 Delivery O2 Flow Rate FiO2


 


18 11:07  98 23 101/63 (76) 97 Room Air  


 


18 10:41  98 25 98/53 (68) 99 Room Air  


 


18 10:28  100 15 98/53 (68) 97 Room Air  


 


18 09:51    107/57 (74)    


 


18 09:48     97   


 


18 09:48     97 Room Air  


 


18 09:25 97.9 103 23 81/53 (62) 99 Room Air  


 


18 09:23  100   99 Room Air  


 


18 09:21 97.9 100 27 81/53 (62 99   








Physical Exam


GENERAL: This is a well-nourished, well-developed patient, in no apparent 

distress.


SKIN: No rashes, ecchymoses or lesions. Cool and dry.


HEAD: Atraumatic. Normocephalic. No temporal or scalp tenderness.


EYES: Pupils equal round and reactive. Extraocular motions intact. No scleral 

icterus. No injection or drainage. 


ENT: Nose without bleeding, purulent drainage or septal hematoma. Throat 

without erythema, tonsillar hypertrophy or exudate. Uvula midline. Airway 

patent.


NECK: Trachea midline. No JVD or lymphadenopathy. Supple, nontender, no 

meningeal signs.


CARDIOVASCULAR: Regular rate and rhythm without murmurs, gallops, or rubs. 


RESPIRATORY: Clear to auscultation. Breath sounds equal bilaterally. No wheezes

, rales, or rhonchi.  


GASTROINTESTINAL: Abdomen soft, non-tender, nondistended. No hepato-splenomegaly

, or palpable masses. No guarding.


MUSCULOSKELETAL: Extremities without clubbing, cyanosis, or edema. No joint 

tenderness, effusion, or edema noted. No calf tenderness. Negative Homans sign 

bilaterally.


NEUROLOGICAL: Awake and alert. Cranial nerves II through XII intact.  Motor and 

sensory grossly within normal limits. Five out of 5 muscle strength in all 

muscle groups.  Normal speech.


Laboratory





Laboratory Tests








Test


  18


09:25 18


10:34 18


10:43 18


12:41


 


White Blood Count 5.2    


 


Red Blood Count 4.21    


 


Hemoglobin 13.7    


 


Hematocrit 41.2    


 


Mean Corpuscular Volume 97.8    


 


Mean Corpuscular Hemoglobin 32.7    


 


Mean Corpuscular Hemoglobin


Concent 33.4 


  


  


  


 


 


Red Cell Distribution Width 13.6    


 


Platelet Count 109    


 


Mean Platelet Volume 9.9    


 


Neutrophils (%) (Auto) 58.2    


 


Lymphocytes (%) (Auto) 28.1    


 


Monocytes (%) (Auto) 12.6    


 


Eosinophils (%) (Auto) 0.5    


 


Basophils (%) (Auto) 0.6    


 


Neutrophils # (Auto) 3.0    


 


Lymphocytes # (Auto) 1.5    


 


Monocytes # (Auto) 0.7    


 


Eosinophils # (Auto) 0.0    


 


Basophils # (Auto) 0.0    


 


CBC Comment DIFF FINAL    


 


Differential Comment     


 


Prothrombin Time  12.0   


 


Prothromb Time International


Ratio 


  1.2 


  


  


 


 


Activated Partial


Thromboplast Time 


  25.1 


  


  


 


 


Blood Urea Nitrogen   19  


 


Creatinine   1.08  


 


Random Glucose   101  


 


Total Protein   6.7  


 


Albumin   3.0  


 


Calcium Level   8.4  


 


Alkaline Phosphatase   154  


 


Aspartate Amino Transf


(AST/SGOT) 


  


  31 


  


 


 


Alanine Aminotransferase


(ALT/SGPT) 


  


  17 


  


 


 


Total Bilirubin   0.3  


 


Sodium Level   136  


 


Potassium Level   3.5  


 


Chloride Level   100  


 


Carbon Dioxide Level   26.4  


 


Anion Gap   10  


 


Estimat Glomerular Filtration


Rate 


  


  60 


  


 


 


Total Creatine Kinase   126  


 


Creatine Kinase MB   8.0  


 


Troponin I   0.45  








Result Diagram:  


1825                                                                   

             18 1043





Imaging





Last Impressions








Aorta CTA 18 1001 Signed





Impressions: 





 Service Date/Time:  Morgan, May 20, 2018 10:03 - CONCLUSION:  No significant 





 change in the thoracic aortic, abdominal aortic, and iliac artery aneurysms 

with 





 mural thrombus and dissection flap when compared to CTA performed 5 days ago.  





 Please refer to the report of the prior CTA for a detailed description of the 





 findings.          Edmundo Allen MD 


 


Chest X-Ray 1820 Signed





Impressions: 





 Service Date/Time:  Morgan, May 20, 2018 10:19 - CONCLUSION:  No acute 

findings 





 in the chest.  No evidence of pleural effusion or pneumothorax.     Edmundo Allen MD 








Images reviewed by me.





Caprini VTE Risk Assessment


Caprini VTE Risk Assessment:  Mod/High Risk (score >= 2)


Caprini Risk Assessment Model











 Point Value = 1          Point Value = 2  Point Value = 3  Point Value = 5


 


Age 41-60


Minor surgery


BMI > 25 kg/m2


Swollen legs


Varicose veins


Pregnancy or postpartum


History of unexplained or recurrent


   spontaneous 


Oral contraceptives or hormone


   replacement


Sepsis (< 1 month)


Serious lung disease, including


   pneumonia (< 1 month)


Abnormal pulmonary function


Acute myocardial infarction


Congestive heart failure (< 1 month)


History of inflammatory bowel disease


Medical patient at bed rest Age 61-74


Arthroscopic surgery


Major open surgery (> 45 min)


Laparoscopic surgery (> 45 min)


Malignancy


Confined to bed (> 72 hours)


Immobilizing plaster cast


Central venous access Age >= 75


History of VTE


Family history of VTE


Factor V Leiden


Prothrombin 51309J


Lupus anticoagulant


Anticardiolipin antibodies


Elevated serum homocysteine


Heparin-induced thrombocytopenia


Other congenital or acquired


   thrombophilia Stroke (< 1 month)


Elective arthroplasty


Hip, pelvis, or leg fracture


Acute spinal cord injury (< 1 month)








Prophylaxis Regimen











   Total Risk


Factor Score Risk Level Prophylaxis Regimen


 


0-1      Low Early ambulation


 


2 Moderate Order ONE of the following:


*Sequential Compression Device (SCD)


*Heparin 5000 units SQ BID


 


3-4 Higher Order ONE of the following medications:


*Heparin 5000 units SQ TID


*Enoxaparin/Lovenox 40 mg SQ daily (WT < 150 kg, CrCl > 30 mL/min)


*Enoxaparin/Lovenox 30 mg SQ daily (WT < 150 kg, CrCl > 10-29 mL/min)


*Enoxaparin/Lovenox 30 mg SQ BID (WT < 150 kg, CrCl > 30 mL/min)


AND/OR


*Sequential Compression Device (SCD)


 


5 or more Highest Order ONE of the following medications:


*Heparin 5000 units SQ TID (Preferred with Epidurals)


*Enoxaparin/Lovenox 40 mg SQ daily (WT < 150 kg, CrCl > 30 mL/min)


*Enoxaparin/Lovenox 30 mg SQ daily (WT < 150 kg, CrCl > 10-29 mL/min)


*Enoxaparin/Lovenox 30 mg SQ BID (WT < 150 kg, CrCl > 30 mL/min)


AND


*Sequential Compression Device (SCD)











Assessment and Plan


Problem List:  


(1) NSTEMI (non-ST elevated myocardial infarction)


ICD Code:  I21.4 - Non-ST elevation (NSTEMI) myocardial infarction


(2) YFN (acute kidney injury)


ICD Code:  N17.9 - Acute kidney failure, unspecified


(3) Chest pain


ICD Code:  R07.9 - Chest pain


Status:  Acute


(4) Hx of abdominal aortic aneurysm


ICD Code:  Z86.79 - Hx of abdominal aortic aneurysm


Status:  Acute


(5) HTN (hypertension)


ICD Code:  I10 - Hypertension


Status:  Acute


(6) Thoracic aneurysm


ICD Code:  I71.2 - Thoracic aneurysm


Status:  Acute


(7) COPD (chronic obstructive pulmonary disease)


ICD Code:  J44.9 - Chronic obstructive pulmonary disease, unspecified


Assessment and Plan


78-year-old female with history of HTN, abdominal and thoracic aortic aneurysms

, COPD, GERD, seizure disorder, depression, presents with intermittent chest 

pains.





NSTEMI; patient with atypical chest pains 2 weeks, suspect unstable angina, 

patient is female with borderline diabetes.  


   -With history of thoracic and abdominal aneurysms, checked aorta CTA which 

showed slight enlargement of a sending aortic aneurysm, however improved and 

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 

thorax, abdomen, iliac arteries, all slightly enlarged compared to previous 

exam in 2015 but no acute dissection.  


   -Patient reportedly has been evaluated at [facility] for her aneurysms, told she 

would likely not survive procedure


   -Troponins elevated at 0.45.  Patient had a troponin of 0.36 on discharge.  

Continue to trend troponin.  And EKGs.


   -Cardiology consulted.  The case was discussed by ED physician with Dr. Hair who recommends inpatient admission and treatment with IV heparin.





Thoracic/Abdominal/Iliac Aneurysms: chronic


   -Aorta CTA checked as above, shows gradual enlargements of aneurysms 

compared to 2015


   -Previously evaluated by [facility], not surgical candidate


   -Control BP, much improved


   -Consult vascular surgery.





Hypertension:  


   -Patient previously with accelerated hypertension.  Now patient's blood 

pressure is borderline low.  Hold all antihypertensive medications.





Dementia: Chronic


   -continue donepezil.





Seizure disorder.  Chronic.


   -Continue phenytoin.


   -Seems stable.





Depression: Chronic.  


   -Continue Zoloft.  


   -Will hold off on trazodone due to hypertension.





GERD: Chronic.  


   -Continue PPI.





Tobacco abuse: Chronic


   -Patient counseled on cessation.





Chronic ongoing weight loss: This could be secondary to dementia.  


   -Tobacco cessation could be helpful.  


   -Encourage p.o. intake.





Hyperlipidemia: chronic


   -continue patient's statin





Acute kidney injury


   -Creatinine elevated up to 1.08.  Upon review of records baseline 0.7-0.8.  

I will start the patient IV fluids and monitor BUN and creatinine.





DVT Prophylaxis: SCDs


Code Status


Full code.


Discussed Condition With


ED physician, patient





Physician Certification


2 Midnight Certification Type:  Admission for Inpatient Services


Order for Inpatient Services


The services are ordered in accordance with Medicare regulations or non-

Medicare payer requirements, as applicable.  In the case of services not 

specified as inpatient-only, they are appropriately provided as inpatient 

services in accordance with the 2-midnight benchmark.


Estimated LOS (days):  2


 days is the estimated time the patient will need to remain in the hospital, 

assuming treatment plan goals are met and no additional complications.


Post-Hospital Plan:  Home











Nikolas Gomez MD May 20, 2018 13:59

## 2018-05-21 VITALS — HEART RATE: 72 BPM

## 2018-05-21 VITALS — HEART RATE: 88 BPM

## 2018-05-21 VITALS
TEMPERATURE: 98.2 F | DIASTOLIC BLOOD PRESSURE: 60 MMHG | HEART RATE: 93 BPM | OXYGEN SATURATION: 97 % | RESPIRATION RATE: 18 BRPM | SYSTOLIC BLOOD PRESSURE: 98 MMHG

## 2018-05-21 VITALS
HEART RATE: 68 BPM | OXYGEN SATURATION: 98 % | TEMPERATURE: 97.4 F | SYSTOLIC BLOOD PRESSURE: 146 MMHG | RESPIRATION RATE: 16 BRPM | DIASTOLIC BLOOD PRESSURE: 94 MMHG

## 2018-05-21 VITALS
SYSTOLIC BLOOD PRESSURE: 144 MMHG | HEART RATE: 82 BPM | DIASTOLIC BLOOD PRESSURE: 91 MMHG | OXYGEN SATURATION: 99 % | RESPIRATION RATE: 16 BRPM | TEMPERATURE: 98.1 F

## 2018-05-21 VITALS
DIASTOLIC BLOOD PRESSURE: 69 MMHG | RESPIRATION RATE: 18 BRPM | TEMPERATURE: 98.6 F | OXYGEN SATURATION: 96 % | HEART RATE: 97 BPM | SYSTOLIC BLOOD PRESSURE: 101 MMHG

## 2018-05-21 VITALS — HEART RATE: 76 BPM

## 2018-05-21 VITALS — HEART RATE: 85 BPM

## 2018-05-21 VITALS
OXYGEN SATURATION: 96 % | RESPIRATION RATE: 16 BRPM | SYSTOLIC BLOOD PRESSURE: 142 MMHG | DIASTOLIC BLOOD PRESSURE: 101 MMHG | HEART RATE: 92 BPM | TEMPERATURE: 97.8 F

## 2018-05-21 VITALS — HEART RATE: 84 BPM

## 2018-05-21 VITALS
DIASTOLIC BLOOD PRESSURE: 96 MMHG | HEART RATE: 85 BPM | RESPIRATION RATE: 16 BRPM | TEMPERATURE: 98.1 F | SYSTOLIC BLOOD PRESSURE: 143 MMHG | OXYGEN SATURATION: 98 %

## 2018-05-21 VITALS — HEART RATE: 70 BPM

## 2018-05-21 VITALS — HEART RATE: 92 BPM

## 2018-05-21 VITALS — HEART RATE: 82 BPM

## 2018-05-21 VITALS — HEART RATE: 90 BPM

## 2018-05-21 VITALS — HEART RATE: 86 BPM

## 2018-05-21 VITALS — HEART RATE: 87 BPM

## 2018-05-21 LAB
BASOPHILS # BLD AUTO: 0 TH/MM3 (ref 0–0.2)
BASOPHILS NFR BLD: 0.7 % (ref 0–2)
BUN SERPL-MCNC: 15 MG/DL (ref 7–18)
CALCIUM SERPL-MCNC: 8.4 MG/DL (ref 8.5–10.1)
CHLORIDE SERPL-SCNC: 105 MEQ/L (ref 98–107)
CREAT SERPL-MCNC: 0.96 MG/DL (ref 0.5–1)
EOSINOPHIL # BLD: 0.1 TH/MM3 (ref 0–0.4)
EOSINOPHIL NFR BLD: 1.8 % (ref 0–4)
ERYTHROCYTE [DISTWIDTH] IN BLOOD BY AUTOMATED COUNT: 13.5 % (ref 11.6–17.2)
GFR SERPLBLD BASED ON 1.73 SQ M-ARVRAT: 68 ML/MIN (ref 89–?)
GLUCOSE SERPL-MCNC: 90 MG/DL (ref 74–106)
HCO3 BLD-SCNC: 23.9 MEQ/L (ref 21–32)
HCT VFR BLD CALC: 34 % (ref 35–46)
HCT VFR BLD CALC: 34.2 % (ref 35–46)
HGB BLD-MCNC: 11 GM/DL (ref 11.6–15.3)
HGB BLD-MCNC: 11.2 GM/DL (ref 11.6–15.3)
LYMPHOCYTES # BLD AUTO: 1.6 TH/MM3 (ref 1–4.8)
LYMPHOCYTES NFR BLD AUTO: 37 % (ref 9–44)
MCH RBC QN AUTO: 32 PG (ref 27–34)
MCHC RBC AUTO-ENTMCNC: 32.4 % (ref 32–36)
MCV RBC AUTO: 98.7 FL (ref 80–100)
MONOCYTE #: 0.6 TH/MM3 (ref 0–0.9)
MONOCYTES NFR BLD: 13 % (ref 0–8)
NEUTROPHILS # BLD AUTO: 2 TH/MM3 (ref 1.8–7.7)
NEUTROPHILS NFR BLD AUTO: 47.5 % (ref 16–70)
PLATELET # BLD: 83 TH/MM3 (ref 150–450)
PMV BLD AUTO: 9.9 FL (ref 7–11)
RBC # BLD AUTO: 3.44 MIL/MM3 (ref 4–5.3)
SODIUM SERPL-SCNC: 138 MEQ/L (ref 136–145)
TROPONIN I SERPL-MCNC: 0.43 NG/ML (ref 0.02–0.05)
TROPONIN I SERPL-MCNC: 0.55 NG/ML (ref 0.02–0.05)
WBC # BLD AUTO: 4.3 TH/MM3 (ref 4–11)

## 2018-05-21 PROCEDURE — B2111ZZ FLUOROSCOPY OF MULTIPLE CORONARY ARTERIES USING LOW OSMOLAR CONTRAST: ICD-10-PCS | Performed by: INTERNAL MEDICINE

## 2018-05-21 PROCEDURE — B4101ZZ FLUOROSCOPY OF ABDOMINAL AORTA USING LOW OSMOLAR CONTRAST: ICD-10-PCS | Performed by: INTERNAL MEDICINE

## 2018-05-21 PROCEDURE — 4A023N7 MEASUREMENT OF CARDIAC SAMPLING AND PRESSURE, LEFT HEART, PERCUTANEOUS APPROACH: ICD-10-PCS | Performed by: INTERNAL MEDICINE

## 2018-05-21 PROCEDURE — B2151ZZ FLUOROSCOPY OF LEFT HEART USING LOW OSMOLAR CONTRAST: ICD-10-PCS | Performed by: INTERNAL MEDICINE

## 2018-05-21 RX ADMIN — PHENYTOIN SODIUM SCH MG: 100 CAPSULE, EXTENDED RELEASE ORAL at 21:16

## 2018-05-21 RX ADMIN — MORPHINE SULFATE PRN MG: 2 INJECTION, SOLUTION INTRAMUSCULAR; INTRAVENOUS at 01:07

## 2018-05-21 RX ADMIN — ATORVASTATIN CALCIUM SCH MG: 40 TABLET, FILM COATED ORAL at 21:16

## 2018-05-21 RX ADMIN — Medication SCH ML: at 09:00

## 2018-05-21 RX ADMIN — Medication SCH ML: at 21:19

## 2018-05-21 RX ADMIN — METOPROLOL TARTRATE SCH MG: 25 TABLET, FILM COATED ORAL at 08:41

## 2018-05-21 RX ADMIN — PANTOPRAZOLE SCH MG: 40 TABLET, DELAYED RELEASE ORAL at 08:41

## 2018-05-21 RX ADMIN — PHENYTOIN SODIUM SCH MLS/HR: 50 INJECTION INTRAMUSCULAR; INTRAVENOUS at 01:55

## 2018-05-21 RX ADMIN — PHENYTOIN SODIUM SCH MG: 100 CAPSULE, EXTENDED RELEASE ORAL at 08:41

## 2018-05-21 RX ADMIN — SERTRALINE HYDROCHLORIDE SCH MG: 50 TABLET, FILM COATED ORAL at 08:41

## 2018-05-21 RX ADMIN — DONEPEZIL HYDROCHLORIDE SCH MG: 5 TABLET, FILM COATED ORAL at 21:16

## 2018-05-21 RX ADMIN — METOPROLOL TARTRATE SCH MG: 25 TABLET, FILM COATED ORAL at 21:16

## 2018-05-21 RX ADMIN — MORPHINE SULFATE PRN MG: 2 INJECTION, SOLUTION INTRAMUSCULAR; INTRAVENOUS at 16:36

## 2018-05-21 RX ADMIN — ASPIRIN SCH MG: 325 TABLET, DELAYED RELEASE ORAL at 08:41

## 2018-05-21 NOTE — MA
cc:

Devante Stafford MD

****

 

 

DATE:

05/21/2018

 

PROCEDURES PERFORMED:

Right femoral arterial access, left heart catheterization, left 

ventriculography, coronary angiography and abdominal aortography.

 

DESCRIPTION OF PROCEDURE:

The patient was brought to the cardiac catheterization lab in a 

fasting state.  Using 1%lidocaine for local anesthesia, a 6.5-Sinhala 

sheath was inserted in the right femoral artery.  Next, I used a 3DRC 

catheter and a regular J-wire and navigated all the way up to just 

above the coronaries.  Exchange guidewire technique was used 

throughout the case because of her prominent aneurysms.  Right 

coronary angiography was completed using a 3DRC catheter.  I then 

exchanged to an angled pigtail catheter and crossed the aortic valve 

in the did an LV gram and a pullback.  I then exchanged to a left 4.5 

Renate catheter to obtain angiography of the left coronary artery.  I

then exchanged to the pigtail catheter and shot the abdominal aorta.  

At the end of the procedure, the sheath was being pulled manually.  

There were no complications.

 

HEMODYNAMICS:

1.  Left ventricular pressure is 147/3 with an end diastolic pressure 

of 15.  Aortic pressure is 148/83 with a mean of 110.  There was no 

gradient during pullback from left ventricle to the aorta.

2.  Left ventriculography:  Left ventriculography shows a 

hypercontractile left ventricle.  Estimated ejection fraction is 80%. 

There is no mitral regurgitation seen.

3.  Coronary angiography:  Left main coronary artery is large and 

ectatic.  There is no stenosis.  It bifurcates into the LAD and 

circumflex vessels.  The LAD has about 20% proximal stenosis.  

Diagonal branch has about 10% stenosis.  Circumflex artery is very 

tortuous, but the circumflex and the marginal branches show no 

stenosis.  Right coronary artery is dominant and has about 25% 

proximal stenosis.  Acute marginal branch is diseased and has 90% 

stenosis.

4.  Abdominal aortogram:  Abdominal aortogram was performed to 

evaluate her renal arteries because of the severe hypertension.  She 

has a large abdominal aortic aneurysm and the renal arteries come off 

of this.  The right renal artery is clearly patent.  The left renal 

artery is more difficult to visualize due to overlap of vessels.  It 

is noted that the left common iliac appears to be totally occluded.

 

IMPRESSION:

1.  Unremarkable hemodynamics.

2.  Above normal ejection fraction.

3.  Only mild coronary artery disease.

4.  Large abdominal aortic aneurysm already known.

5.  Seclusion of the left common iliac.

 

RECOMMENDATIONS:

Medical management.

 

 

__________________________________

MD CED Lindo/KIMI

D: 05/21/2018, 08:20 AM

T: 05/21/2018, 08:36 AM

Visit #: [id number]

Job #: [id number]

## 2018-05-21 NOTE — HHI.PR
Objective


Vitals





Vital Signs








  Date Time  Temp Pulse Resp B/P (MAP) Pulse Ox O2 Delivery O2 Flow Rate FiO2


 


5/21/18 15:00 98.1 82 16 144/91 (108) 99   


 


5/21/18 11:00 97.4 70 16 146/94 (111) 98   


 


5/21/18 11:00  68      


 


5/21/18 07:00     97 Room Air  


 


5/21/18 07:00 97.8 92 16 142/101 (115) 96   


 


5/21/18 07:00  77      


 


5/21/18 06:00  84      


 


5/21/18 05:00  88      


 


5/21/18 04:00  93      


 


5/21/18 04:00 98.2 93 18 98/60 (73) 97   


 


5/21/18 04:00      Room Air  


 


5/21/18 03:00  88      


 


5/21/18 02:00  85      


 


5/21/18 01:00  87      


 


5/21/18 00:00      Room Air  


 


5/21/18 00:00 98.6 97 18 101/69 (80) 96   


 


5/21/18 00:00  97      


 


5/20/18 23:00  92      


 


5/20/18 22:00  81      


 


5/20/18 21:00  84      


 


5/20/18 20:00 98.3 91 18 123/44 (70) 98   


 


5/20/18 20:00  91      


 


5/20/18 18:00  74      


 


5/20/18 17:00  70      


 


5/20/18 16:30   18     


 


5/20/18 16:00  70      














I/O      


 


 5/20/18 5/20/18 5/20/18 5/21/18 5/21/18 5/21/18





 07:00 15:00 23:00 07:00 15:00 23:00


 


Intake Total  500 ml 600 ml 1380 ml  


 


Output Total   200 ml 600 ml  


 


Balance  500 ml 400 ml 780 ml  


 


      


 


Intake Oral   600 ml 480 ml  


 


IV Total  500 ml  900 ml  


 


Output Urine Total   200 ml 600 ml  


 


# Bowel Movements    0  








Result Diagram:  


5/21/18 0559 5/21/18 0559








A/P


Problem List:  


(1) NSTEMI (non-ST elevated myocardial infarction)


ICD Code:  I21.4 - Non-ST elevation (NSTEMI) myocardial infarction


(2) YFN (acute kidney injury)


ICD Code:  N17.9 - Acute kidney failure, unspecified


(3) Chest pain


ICD Code:  R07.9 - Chest pain


Status:  Acute


(4) Hx of abdominal aortic aneurysm


ICD Code:  Z86.79 - Hx of abdominal aortic aneurysm


Status:  Acute


(5) HTN (hypertension)


ICD Code:  I10 - Hypertension


Status:  Acute


(6) Thoracic aneurysm


ICD Code:  I71.2 - Thoracic aneurysm


Status:  Acute


(7) COPD (chronic obstructive pulmonary disease)


ICD Code:  J44.9 - Chronic obstructive pulmonary disease, unspecified











Natalia Lima MD May 21, 2018 15:52

## 2018-05-21 NOTE — EKG
Date Performed: 05/21/2018       Time Performed: 01:18:44

 

PTAGE:      78 years

 

EKG:      Sinus rhythm 

 

. Possible left atrial abnormality Left ventricular hypertrophy Ant/septal and lateral ST-T changes a
re probably due to ventricular hypertrophy Abnormal ECG

 

PREVIOUS TRACING       : 05/20/2018 19.14 No significant change from previous tracing noted.

 

DOCTOR:   Paco Calvert  Interpretating Date/Time  05/21/2018 07:59:56

## 2018-05-21 NOTE — CATHPROC
[facility] HIS Report

Study Information

Study Number    Admission           Scheduled Start              Study Start

 

[id number]    May 20 2018 12:50PM      05/21/2018                May 21 2018 7:14AM

 

Universal Service

 

Cardiac Catheterization

 

Admit Source                Facility Department

 

Other                   [facility] - Cath Lab

 

Physician and Clinical Staff

Initial Devante Conte           Circulator     Ariel Arreaga,RN

 

                          Recorder      Lindsay Juares ,RT(R)

 

                          Scrub        Castro Sheppard RCIS(BS)

 

Procedures Performed

Procedure                     Location (Site)            Vessel Name

 

Angiogram LV                    LV                  Ventricle

Coronary Angiograms                 LCA                 Left Coronary

Coronary Angiograms                 RCA                 Right Coronary

Coronary Angiograms                Abd Aorta (A3)            Aorta

L Heart Cath

Equipment

Time            Description           Size          Mfg Part Number  Used/Scraped

                    TRANSDUCER, TRUWAVE                   [id number]

07:45    PANDYA MCDOWELL                       *                    Used

                    W/STOCKCOCK                       *[id number]

                                                [phone]



                                                *[id number]

                                                [phone]



                                                *[id number]

                    PIGTAIL ANG. 145 INFINITI                [phone]



                    CATHETER                        *[id number]

                                                [id number]

07:45    [employer]    PACK, CCL CUSTOM         *                    Used

                                                *[id number]

                                                [id number]

07:45    [employer] PACER       PEN, SKIN DUAL W/ RULER     *                    Used

                                                *[id number]

                                                [id number]

07:45    [employer]       SHEATH, FR6.5 PRELUDE 11CM    FR 6.5         038ACT      Used

                                                *[id number]

                                                [id number]

07:45    [employer]       WIRE, 3MMJ .035 180CM      180CM                  Used

                                                *[id number]

                                                [id number]

07:52    [employer]       WIRE, EXCHANGE 260CM 3MMJ    260CM                  Used

                                                *[id number]

                                                [id number]

07:45    [employer]           MANIFOLD, 4 PORT         *                    Used

                                                *[id number]

07:45    [employer]          OMNIPAQUE, 350 MG, 150ML     150ML         [id number]      Used

 

07:45    [employer]          OMNIPAQUE, 350 MG, 50ML     50ML          [id number]      Used

 

08:07    [employer]          OMNIPAQUE, 350 MG, 50ML     50ML          [id number]      Used

                                                [id number]

07:45    [employer]       BLANKET,WARM AIR CCL       *                    Used

                                                *[id number]

 

History: Current Medications

Medication         Dosage/Unit       Route      Frequency     Last Date/Time Taken

 

Statins (any)

 

ASA

 

HYDRALAZINE

 

 

History: Allergies

Allergy               Reaction

*MDRO Multi-Drug Resistant

Organism

codeine

 

acetaminophen

 

MRI PRECAUTION           ANEURYSM CLIP-(JLT) 4/23/09 DR. MERCADO

 

 

History: Risk Factors

                     Family History of

Hypertension     Dyslipidemia               Previous MI    Previous Heart Failure

                     Premature CAD

Yes         Yes         No          Yes        No

Prior Valve

           Prior PCI      Prior CABG

Surgery

No          No          No

           Cerebrovascular   Peripheral Artery  Chronic Lung

On Dialysis                                   Diabetes

           Disease       Disease       Disease

No          No          No          Yes        No

History: Stress Tests

Stress or Imaging Studies Performed

 

No

 

 

History: Other

Current Smoker      Method       Packs a Day       Years Used           Pack Years

 

Yes           Cigarettes     1            60               60

 

Labs

Hgb (g/dl)        Hct (%)          WBC (l/cumm)        Platelets (thousands)

 

11.60-17.00       35.00-51.00        4.00-11.00         150..00

 

11.0           34            4.3             83

 

Glucose (mg/dl)     BUN (mg/dl)        Creatinine (mg/dl)    BUN:Creatinine (1:x)

74..00       7.00-18.00        0.50-1.30        10.00-20.00

 

101           19            1.1           17.3

 

Na (meq/l)        K (meq/l)

 

136..00      3.50-5.10

 

136           3.5

 

INR (PTT:PT)

 

0.90-1.10

 

1.2

 

Troponin I (ng/ml)    CPK (u/l)         CPK-MB (ng/ML)

 

0.02-0.05        26..00       0.50-3.60

 

0.55           109            7.0

 

 

 

 

Medication

Medication Total Dose (Bolus/Oral)

Medication              Total Dosage/Unit

 

1% XYLOCAINE                20 mL

 

VERSED                     1 mg

 

Medications (Bolus/Oral)

Medication           Time Given          Dosage/Unit       Administered By       Reason

 

VERSED             5/21/2018 7:43:25 AM       1 mg         Ariel Arreaga

1 mg VERSED given in lab by Ariel Arreaga RN in Left Antecubital via Peripheral IV. Ordered by Devante Stafford.

 

1% XYLOCAINE          5/21/2018 7:45:48 AM      20 mL         Devante Stafford

Patient arrived on 20 mL 1% XYLOCAINE given by Devante Stafford in Right Groin via Subcutaneous.

 

Medication (Drip)

Medication           Time Given          Dosage/Unit      Concentration/Unit  Diluent (ml)   Solution


 

IV Solutions          5/21/2018 7:20:18 AM      50 mL (IV)                         NaCl .9

Patient arrived on IV Solutions in Left Forearm via Peripheral IV. Pump/Drip Flow using NaCl .9.

Initial Case Assessment

Cardiovascular

HR          NIBP          Chest Pain

 

78          140/89         0

 

Edema Present     Skin color          Skin

 

None         Normal            Warm Dry

 

Circulatory - Right Pulses

Dorsalis Pedis    Femoral

 

2           2

 

Scale (0,1,2,3,4,d)

 

Circulatory - Left Pulses

Dorsalis Pedis    Femoral

 

2           2

 

Scale (0,1,2,3,4,d)

 

Circulatory - Lower Extremities

Color Lower Right   Color Lower Left

 

 

Normal        Normal

 

Neurological State

           Oriented to time-place-

Alert                     Moves all extremities

           person

 

Respiration - General

Respiration Rate

           SpO2 (%)

(B/min)

15          98

Final Case Assessment

Cardiovascular

HR           NIBP           Chest Pain

 

78           140/89          0

 

Edema Present      Skin color           Skin

 

None           Normal             Warm Dry

 

Circulatory - Right Pulses

Dorsalis Pedis     Femoral

 

2            2

 

Scale (0,1,2,3,4,d)

 

Circulatory - Left Pulses

Dorsalis Pedis     Femoral

 

2            2

 

Scale (0,1,2,3,4,d)

 

Circulatory - Lower Extremities

Color Lower Right    Color Lower Left

 

 

Normal         Normal

 

Neurological State

            Oriented to time-place-

Alert                      Moves all extremities

            person

 

Respiration - General

Respiration Rate

            SpO2 (%)

(B/min)

15           98

 

Chronological Log

Time    Study Chronological Log

 

7:13:46   Patient arrived via Bed.

 

7:13:47   Patient Name, D.O.B, / Armband Verified By R.N.

 

7:20:04   Consent signed by the physician and the patient and verified by the Cath Lab staff.

 

7:20:05   Pre-op and post- op instructions given; patient acknowledges understanding of instructions.


 

7:20:05   Verbal Stimulation=2 Physical Stimulation=2 Airway=2 Respiration=2 TOTAL=8. (0=absent, 1=li
mited, 2=present)

 

7:20:08   Patient has been NPO for More than 6Hrs.

 

7:20:10   Skin Breakdown- none per patient

 

7:20:13   Patient Warmer Placed on the Table.

 

7:20:14   Danitza Prominences Protected

 

7:20:16   A # 20 IV was noted in the Antecubital (right). Grade = 0

 

7:20:17   A # 20 IV was noted in the Forearm (left). Grade = 0

 

7:20:18   Patient arrived on IV Solutions in Left Forearm via Peripheral IV. Pump/Drip Flow using NaC
l .9.

 

7:20:18   History and physical on the chart or being dictated.

     Assessment: Initial Case, HR=78 BPM, KVUB=701/89 mmhg, Chest Pain=0, Edema=None, Color=Normal, S
kin =

     Warm, Dry

     Right Pulses: Kingsley Ped=2, Femoral=2

     Left Pulses: Kingsley Ped=2, Femoral=2

7:20:19

     Lower Right Extremities: Color=Normal

     Lower Left Extremities: Color=Normal

     Neurological: State=Alert, Ox3, GALICIA

     Respiration: Resp=15 B/min, SpO2=98 %

7:23:01  Bilateral groins prepped with 2% chlorhexidine, and draped after a 3 minute waiting time.

     Vitals capture started with the following parameters, Patient=Adult, Interval=5 min, Initial Pre
hetug=554 mmHg,

7:24:27

     Deflation Rate=5 mmHg, Cuff placed on Left Arm

7:25:03  HR=78 bpm, ZLFO=684/81 mmhg, SpO2=97.0 %, Resp=14 B/min, Pain=0, Cherrie=10, Zambrano=2

 

7:26:38  MD paged

 

7:29:58  HR=75 bpm, EMXG=021/78 mmhg, SpO2=97.0 %, Resp=12 B/min, Pain=0, Cherrie=10, Zambrano=2

 

7:33:16  Pressure channel 1 zeroed.

 

7:34:08  Reference ECG taken

 

7:34:57  HR=75 bpm, YNFV=627/78 mmhg, SpO2=97.0 %, Resp=13 B/min, Pain=0, Cherrie=10, Zambrano=2

 

7:39:56  HR=78 bpm, VFDS=600/83 mmhg, SpO2=97.0 %, Resp=12 B/min, Pain=0, Cherrie=10, Zambrano=2

 

7:42:03  MD arrived.

 

7:43:25  1 mg VERSED given in lab by Ariel Arreaga, RN in Left Antecubital via Peripheral IV. Ordered 
by Devante Stafford.

 

7:43:39  tim james

 

7:44:27  Contrast Scanned

 

7:44:59  HR=76 bpm, VRGX=480/81 mmhg, SpO2=96.0 %, Resp=14 B/min

     Time Out. Correct patient, correct procedure, correct physician, labs, allergies, and equipment 
verified with cath lab

7:45:39  team present. Fire risk assesment completed (see hard stop sheet for coding). Time Out Concu
rred by MD and

     individual staff in procedure.

7:45:48  Patient arrived on 20 mL 1% XYLOCAINE given by Devante Stafford in Right Groin via Subcutaneous
.

 

7:45:57  Case Start

 

7:48:14  Access site was Right Femoral Artery.

 

7:48:32  A SHEATH, FR6.5 PRELUDE 11CM FR 6.5 was advanced into the Fem Art (right) using the Renate 
technique.

     A 3DRC INFINITI CATHETER FR 6 was advanced over a wire. OMNIPAQUE, 350 MG, 150ML 150ML was used 
for

7:49:53

     injections.

7:49:58  HR=88 bpm, YPJI=068/89 mmhg, SpO2=95.0 %, Resp=18 B/min

     Recorded Pressure: Ao, HR=91, Condition=Condition 1

7:51:32

     (Aorta) Ao 143/88/111

7:52:57  The  RCA was injected and visualized at various angles. OMNIPAQUE, 350 MG, 150ML 150ML used.


     After removing the current catheter a PIGTAIL ANG. 145 INFINITI CATHETER FR 6 was advanced over 
a WIRE,

7:53:42

     EXCHANGE 260CM 3MMJ 260CM.

7:54:59  HR=91 bpm, KMEP=251/89 mmhg, SpO2=93.0 %, Resp=18 B/min

     Recorded Pressure: LV, HR=89, Condition=Condition 1

7:57:21

     (Left Ventricle) /7/15

7:57:39  The LV was injected at 10 cc/sec for a total of 40. OMNIPAQUE, 350 MG, 50ML 50ML used.

 

7:58:32  Pressure channel 1 zeroed.

 

8:00:02  HR=91 bpm, DSAF=742/89 mmhg, SpO2=97.0 %, Resp=17 B/min

     Recorded Pressure: LV, Ao, HR=92, Condition=Condition 1

8:00:11  (Left Ventricle) /3/15,

     (Aorta) Ao 148/83/110

     After removing the current catheter a JL 4.5 INFINITI CATHETER FR 6 was advanced over a WIRE, EX
CHANGE 260CM

8:01:50

     3MMJ 260CM.

8:02:12   The  LCA was injected and visualized at various angles. OMNIPAQUE, 350 MG, 150ML 150ML used
.

 

8:05:02   HR=95 bpm, LDRV=478/101 mmhg, SpO2=95.0 %, Resp=18 B/min

 

8:05:41   lindsay agus rande

      After removing the current catheter a PIGTAIL ANG. 145 INFINITI CATHETER FR 6 was advanced over
 a WIRE,

8:06:21

      EXCHANGE 260CM 3MMJ 260CM.

8:07:13   Activated Clotting Time Drawn

 

8:09:55   The Abd Aorta (A3) was injected and visualized at various angles. OMNIPAQUE, 350 MG, 150ML 
150ML used.

 

8:10:07   AJ=496 bpm, CCEH=067/89 mmhg, SpO2=95.0 %, Resp=18 B/min, Pain=0, Cherrie=10, Zambrano=2

 

8:11:06   Catheter was removed

      Assessment: Final Case, HR=78 BPM, UNTC=087/89 mmhg, Chest Pain=0, Edema=None, Color=Normal, Sk
in = Warm,

      Dry

      Right Pulses: Kingsley Ped=2, Femoral=2

      Left Pulses: Kingsley Ped=2, Femoral=2

8:11:21

      Lower Right Extremities: Color=Normal

      Lower Left Extremities: Color=Normal

      Neurological: State=Alert, Ox3, GALICIA

      Respiration: Resp=15 B/min, SpO2=98 %

8:11:33   Catheter(s) removed without difficulty

 

8:11:45   Case End

 

8:12:10   No case complications noted.

 

8:12:25   Cine recording checked.

 

8:12:27   Bedside Report will be given.

 

8:12:31   A Left Heart Cath was performed.

 

8:12:36   Activated Clotting Time Drawn

 

8:15:01   ACT (Normal Range ) = 137

 

8:15:43   HR=98 bpm, SQIQ=335/109 mmhg, SpO2=97.0 %, Resp=20 B/min, Pain=0, Cherrie=10, Zambrano=2

 

8:16:06   Sheath(s) left in place, will be removed in Holding Area

 

8:16:09   Sterile dressing applied to site

 

8:20:05   HR=95 bpm, XJIV=831/106 mmhg, SpO2=96.0 %, Resp=14 B/min, Pain=0, Cherrie=10, Zambrano=2

 

8:23:25   Patient moved to stretcher

 

8:24:46   Vitals capture stopped.

 

 

 

 

End Study - Contrast Media Used In Study

Contrast       Total Opened (mL)     Total Used (mL)     Total Wasted (mL)

 

Omnipaque      125            125           0

 

End Study - Maximum Contrast Load

Max Contrast Load (mL)

 

181.8

End Study - Radiation Exposure

Fluoro Time

(minutes)

8.3

 

 

End Study - Patient Disposition

Complications  Transferred To  Interventional Outcome

 

No       Telemetry Bed   No attempt made

## 2018-05-21 NOTE — HHI.PR
Subjective


Remarks


"I have stomach aching "patient reported 9 out of 10 severity





This is a follow-up on chest pain and history of AAA


No nausea or vomiting, today hemoglobin dropped from 13.7-11 platelet 1.9 

dropped to 83 which is concerning considering his history of the abdominal 

aortic aneurysm, I will check H&H every 6 hours with close monitoring 

especially with the patient on heparin drip, heart cath came back negative





Objective


Vitals





Vital Signs








  Date Time  Temp Pulse Resp B/P (MAP) Pulse Ox O2 Delivery O2 Flow Rate FiO2


 


5/21/18 11:00  68      


 


5/21/18 07:00     97 Room Air  


 


5/21/18 07:00 97.8 92 16 142/101 (115) 96   


 


5/21/18 07:00  77      


 


5/21/18 06:00  84      


 


5/21/18 05:00  88      


 


5/21/18 04:00  93      


 


5/21/18 04:00 98.2 93 18 98/60 (73) 97   


 


5/21/18 04:00      Room Air  


 


5/21/18 03:00  88      


 


5/21/18 02:00  85      


 


5/21/18 01:00  87      


 


5/21/18 00:00      Room Air  


 


5/21/18 00:00 98.6 97 18 101/69 (80) 96   


 


5/21/18 00:00  97      


 


5/20/18 23:00  92      


 


5/20/18 22:00  81      


 


5/20/18 21:00  84      


 


5/20/18 20:00 98.3 91 18 123/44 (70) 98   


 


5/20/18 20:00  91      


 


5/20/18 18:00  74      


 


5/20/18 17:00  70      


 


5/20/18 16:30   18     


 


5/20/18 16:00  70      


 


5/20/18 15:08 97.6 101 18 113/69 (84) 97   


 


5/20/18 15:00  93      














I/O      


 


 5/20/18 5/20/18 5/20/18 5/21/18 5/21/18 5/21/18





 07:00 15:00 23:00 07:00 15:00 23:00


 


Intake Total  500 ml 600 ml 1380 ml  


 


Output Total   200 ml 600 ml  


 


Balance  500 ml 400 ml 780 ml  


 


      


 


Intake Oral   600 ml 480 ml  


 


IV Total  500 ml  900 ml  


 


Output Urine Total   200 ml 600 ml  


 


# Bowel Movements    0  








Result Diagram:  


5/21/18 0559                                                                   

             5/21/18 0559





Objective Remarks


GENERAL: This is a well-nourished, well-developed patient, in no apparent 

distress.


CARDIOVASCULAR: RRR, S3


RESPIRATORY: Fair air entry bilaterally. No W, R, or R


GASTROINTESTINAL: Abdomen soft, non-tender, nondistended.  Positive bowel sounds


MUSCULOSKELETAL: Extremities without clubbing, cyanosis, or edema.  Pedal 

pulses appreciated


NEUROLOGICAL: Awake and alert.  Moves all extremity.  Normal speech.no focal 

neurological deficit





A/P


Problem List:  


(1) NSTEMI (non-ST elevated myocardial infarction)


ICD Code:  I21.4 - Non-ST elevation (NSTEMI) myocardial infarction


(2) YFN (acute kidney injury)


ICD Code:  N17.9 - Acute kidney failure, unspecified


(3) Chest pain


ICD Code:  R07.9 - Chest pain


Status:  Acute


(4) Hx of abdominal aortic aneurysm


ICD Code:  Z86.79 - Hx of abdominal aortic aneurysm


Status:  Acute


(5) HTN (hypertension)


ICD Code:  I10 - Hypertension


Status:  Acute


(6) Thoracic aneurysm


ICD Code:  I71.2 - Thoracic aneurysm


Status:  Acute


(7) COPD (chronic obstructive pulmonary disease)


ICD Code:  J44.9 - Chronic obstructive pulmonary disease, unspecified


Assessment and Plan


78-year-old female with history of HTN, abdominal and thoracic aortic aneurysms

, COPD, GERD, seizure disorder, depression, presents with intermittent chest 

pains.








5/21:No nausea or vomiting, today hemoglobin dropped from 13.7-11 platelet 1.9 

dropped to 83 which is concerning considering his history of the abdominal 

aortic aneurysm, I will check H&H every 6 hours with close monitoring 

especially with the patient on heparin drip, heart cath came back negative








A/P:


Chest pain with elevated troponin


 


   -With history of thoracic and abdominal aneurysms, checked aorta CTA which 

showed slight enlargement of a sending aortic aneurysm, however improved and 

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 

thorax, abdomen, iliac arteries, all slightly enlarged compared to previous 

exam in 2015 but no acute dissection.  


   -Patient reportedly has been evaluated at [facility] for her aneurysms, told she 

would likely not survive procedure


   -Troponins elevated at 0.45.  Patient had a troponin of 0.36 on discharge.  

Continue to trend troponin.  And EKGs.


   -Cardiology consulted.  The case was discussed by ED physician with Dr. Hair who recommends inpatient admission and treatment with IV heparin.





Thoracic/Abdominal/Iliac Aneurysms: chronic


   -Aorta CTA checked as above, shows gradual enlargements of aneurysms 

compared to 2015


   -Previously evaluated by [facility], not surgical candidate


   -Control BP, much improved


   -Consult vascular surgery.





Hypertension:  


   -Patient previously with accelerated hypertension.  Now patient's blood 

pressure is borderline low.  Hold all antihypertensive medications.





Dementia: Chronic


   -continue donepezil.





Seizure disorder.  Chronic.


   -Continue phenytoin.


   -Seems stable.





Depression: Chronic.  


   -Continue Zoloft.  


   -Will hold off on trazodone due to hypertension.





GERD: Chronic.  


   -Continue PPI.





Tobacco abuse: Chronic


   -Patient counseled on cessation.





Chronic ongoing weight loss: This could be secondary to dementia.  


   -Tobacco cessation could be helpful.  


   -Encourage p.o. intake.





Hyperlipidemia: chronic


   -continue patient's statin





Acute kidney injury


   -Creatinine elevated up to 1.08.  Upon review of records baseline 0.7-0.8.  

I will start the patient IV fluids and monitor BUN and creatinine.





DVT Prophylaxis: Natalia Leung MD May 21, 2018 14:52

## 2018-05-21 NOTE — MB
cc:

Cortes Hair DO

****

 

 

DATE:

05/20/2018

 

REASON FOR CONSULTATION:

Chest pain, elevated troponin.

 

HISTORY OF PRESENT ILLNESS:

Katey Blake is a pleasant 78-year-old female who presented to 

[facility] due to chest pain.  She 

previously was here earlier in the week and had chest pain at that 

time with an elevated troponin.  She was seen by Dr. Stafford and after 

discussion with her about an ischemic evaluation versus medical 

management, she decided on medical management.  She was started on 

blood pressure medications as she had extensive hypertension.   Upon 

coming in, blood pressure is noted to be somewhat hypotensive and she 

has had chest pain on and off throughout the past day or so.  She was 

found to have an elevated troponin and because of this, we were 

consulted.  Pain is somewhat substernal, on and off, but not really 

related to activity.

 

PAST MEDICAL HISTORY:

1.  Hypertension.

2.  COPD.

3.  Abdominal and thoracic aortic aneurysm.

4.  Seizure disorder.

5.  Depression.

6.  GERD.

 

PAST SURGICAL HISTORY:

1.  Left forearm surgery.

2.  Brain surgery for aneurysm around 15 years ago.

3.  Hysterectomy.

 

ALLERGIES:

1.  TYLENOL.

2.  CODEINE.

 

MEDICATIONS:

1.  Donepezil 5 mg every night.

2.  Albuterol 2 puffs every 4-6 hours as needed for shortness of 

breath.

3.  Lipitor 40 mg every night.

4.  Hydralazine 50 mg every 8 hours.

5.  Metoprolol tartrate 25 mg b.i.d.

6.  Nifedipine 90 mg daily.

7.  Losartan 50 mg daily.

8.  Phenytoin 100 mg b.i.d.

9.  Zoloft 50 mg daily.

10.  Trazodone 100 mg every night.

11.  Hydrochlorothiazide 25 mg daily.

12.  Protonix 40 mg daily.

 

FAMILY HISTORY:

Father had a stroke.  Mother had heart problems.

 

SOCIAL HISTORY:

The patient smokes 2 cigarettes per day.  She smoked for the past 50 

years.  Denies alcohol or drug abuse.

 

REVIEW OF SYSTEMS:

Fourteen systems were reviewed including osteopathic.  Pertinent 

positives and negatives above, otherwise negative.

 

PHYSICAL EXAMINATION:

VITAL SIGNS:  Temperature 97.6, heart rate 99, blood pressure 105/57, 

respirations 20, pulse oximetry 99% on room air.

GENERAL:  The patient appears well, in no acute distress.  Alert, 

awake and oriented x 3.

HEENT:  Extraocular muscles intact.  Mucous membranes moist.

NECK:  Supple.  No JVD at 45 degrees.  No carotid bruits heard 

bilaterally.  Carotid upstroke is brisk in nature.

HEART:  Regular rate and rhythm.  Positive first and second heart 

sounds, with no noted murmurs, gallops or rubs.

LUNGS:  Clear to auscultation bilaterally.  No wheezes, rales or 

rhonchi.

ABDOMEN:  Soft, nontender, nondistended.  No organomegaly noted.

EXTREMITIES:  Show no clubbing, cyanosis or edema.  Femoral pulses 

intact.  Distal pulses are somewhat diminished.

NEUROLOGIC:  No focal deficits.

SKIN:  Warm, dry and intact.

 

OSTEOPATHIC:

No kyphoscoliosis, lordosis or paraspinal tender points.

 

LABORATORY DATA:

Hemoglobin 13.7, hematocrit 41.2, platelets 109.  Potassium 3.5, BUN 

19, creatinine 1.0.  A troponin 0.48.

 

ELECTROCARDIOGRAM:

(05/20/2018 at 12:58), sinus rhythm, biatrial enlargement, LVH with 

secondary ST-T wave changes, cannot rule out ischemia.

 

IMPRESSION:

1.  Chest pain concerning chest pain, somewhat atypical for coronary 

insufficiency.

2.  Elevated troponin, possibly type 1 versus type 2.

3.  Extensive thoracic and abdominal aneurysm by CT.

4.  Tobacco abuse.

5.  Chronic obstructive pulmonary disease.

 

RECOMMENDATIONS:

1.  Overall, Ms. Blake presented with chest pain and elevated 

troponin more than her previous episode.

2.  We attempted to treat this medically and her blood pressure is now

controlled and she still has chest pain, which may signify coronary 

artery disease.

3.  Overall, it is a complicated case, as she has extensive thoracic 

abdominal aneurysm, which she was seen at [facility] and they recommended 

continued medical therapy as they think that she would not be able to 

survive the surgery necessary for fixing her aneurysm.

4.  Case was discussed with Dr. Stafford and we will plan on keeping her

n.p.o. and plan for cardiac catheterization in the morning.

5.  There is an overall concern of multivessel disease, as she does 

have extensive peripheral vascular disease.

6.  We will back down on some of her antihypertensives as she is 

hypotensive at this time.  She should stay on beta beta-blocker and 

calcium channel blocker therapy as possible due to her aneurysm.

7.  Further recommendations will be made based on the hospital course.

 

Thank you for allowing me to see Katey Blake.  If there are any 

questions, please do not hesitate to call.

 

 

__________________________________

DO RYAN Lyle/JACOB

D: 05/21/2018, 12:03 AM

T: 05/21/2018, 12:44 AM

Visit #: [id number]

Job #: [id number]

## 2018-05-22 VITALS
RESPIRATION RATE: 14 BRPM | HEART RATE: 103 BPM | OXYGEN SATURATION: 100 % | TEMPERATURE: 97.9 F | SYSTOLIC BLOOD PRESSURE: 139 MMHG | DIASTOLIC BLOOD PRESSURE: 92 MMHG

## 2018-05-22 VITALS — HEART RATE: 77 BPM

## 2018-05-22 VITALS — HEART RATE: 73 BPM | RESPIRATION RATE: 16 BRPM

## 2018-05-22 VITALS — HEART RATE: 70 BPM

## 2018-05-22 VITALS — HEART RATE: 92 BPM

## 2018-05-22 VITALS — HEART RATE: 76 BPM

## 2018-05-22 VITALS — HEART RATE: 80 BPM

## 2018-05-22 VITALS — HEART RATE: 72 BPM

## 2018-05-22 LAB
BASOPHILS # BLD AUTO: 0 TH/MM3 (ref 0–0.2)
BASOPHILS NFR BLD: 0.6 % (ref 0–2)
BUN SERPL-MCNC: 11 MG/DL (ref 7–18)
CALCIUM SERPL-MCNC: 8.5 MG/DL (ref 8.5–10.1)
CHLORIDE SERPL-SCNC: 103 MEQ/L (ref 98–107)
CREAT SERPL-MCNC: 0.86 MG/DL (ref 0.5–1)
EOSINOPHIL # BLD: 0.1 TH/MM3 (ref 0–0.4)
EOSINOPHIL NFR BLD: 2.3 % (ref 0–4)
ERYTHROCYTE [DISTWIDTH] IN BLOOD BY AUTOMATED COUNT: 13.8 % (ref 11.6–17.2)
GFR SERPLBLD BASED ON 1.73 SQ M-ARVRAT: 77 ML/MIN (ref 89–?)
GLUCOSE SERPL-MCNC: 100 MG/DL (ref 74–106)
HCO3 BLD-SCNC: 24.6 MEQ/L (ref 21–32)
HCT VFR BLD CALC: 32.9 % (ref 35–46)
HCT VFR BLD CALC: 33.4 % (ref 35–46)
HGB BLD-MCNC: 10.9 GM/DL (ref 11.6–15.3)
HGB BLD-MCNC: 11.1 GM/DL (ref 11.6–15.3)
LYMPHOCYTES # BLD AUTO: 1.2 TH/MM3 (ref 1–4.8)
LYMPHOCYTES NFR BLD AUTO: 23.2 % (ref 9–44)
MCH RBC QN AUTO: 32.3 PG (ref 27–34)
MCHC RBC AUTO-ENTMCNC: 33.1 % (ref 32–36)
MCV RBC AUTO: 97.5 FL (ref 80–100)
MONOCYTE #: 0.7 TH/MM3 (ref 0–0.9)
MONOCYTES NFR BLD: 13.4 % (ref 0–8)
NEUTROPHILS # BLD AUTO: 3.1 TH/MM3 (ref 1.8–7.7)
NEUTROPHILS NFR BLD AUTO: 60.5 % (ref 16–70)
PLATELET # BLD: 81 TH/MM3 (ref 150–450)
PMV BLD AUTO: 9.8 FL (ref 7–11)
RBC # BLD AUTO: 3.42 MIL/MM3 (ref 4–5.3)
SODIUM SERPL-SCNC: 137 MEQ/L (ref 136–145)
WBC # BLD AUTO: 5.1 TH/MM3 (ref 4–11)

## 2018-05-22 RX ADMIN — PHENYTOIN SODIUM SCH MG: 100 CAPSULE, EXTENDED RELEASE ORAL at 09:14

## 2018-05-22 RX ADMIN — ASPIRIN SCH MG: 325 TABLET, DELAYED RELEASE ORAL at 09:14

## 2018-05-22 RX ADMIN — METOPROLOL TARTRATE SCH MG: 25 TABLET, FILM COATED ORAL at 09:14

## 2018-05-22 RX ADMIN — PANTOPRAZOLE SCH MG: 40 TABLET, DELAYED RELEASE ORAL at 09:14

## 2018-05-22 RX ADMIN — SERTRALINE HYDROCHLORIDE SCH MG: 50 TABLET, FILM COATED ORAL at 09:14

## 2018-05-22 RX ADMIN — Medication SCH ML: at 09:00

## 2018-05-22 NOTE — HHI.FF
Face to Face Verification


Diagnosis:  


(1) Accelerated hypertension


(2) Dementia


Home Health Nursing








Order: Medical education





 Signs/symptoms of disease process





 Medication education-adverse effect





 Nursing assessment with vital signs

















I have seen patient Katey Blake on 5/22/18. My clinical findings support 

the need for the requested home health care services because:








 Need for psychosocial assistance














I certify that my clinical findings support that this patient is homebound 

because:








 Need for psychosocial assistance

















Francis Darling MD May 22, 2018 09:17

## 2018-05-22 NOTE — PD.CARD.PN
Subjective


Subjective Remarks


no pain, no complaints





Objective


Medications





Current Medications








 Medications


  (Trade)  Dose


 Ordered  Sig/Jamee


 Route  Start Time


 Stop Time Status Last Admin


 


  (Heparin Inj)  5,000 units  UNSCH  PRN


 IV PUSH  5/20/18 18:00


     


 


 


  (Heparin Inj)  2,500 units  UNSCH  PRN


 IV PUSH  5/20/18 18:00


     


 


 


 Heparin Sodium/


 Dextrose  250 ml @ 


 4.8 mls/hr  TITRATE  PRN


 IV  5/20/18 12:30


    5/20/18 12:38


 


 


  (NS Flush)  2 ml  BID


 IV FLUSH  5/20/18 21:00


    5/22/18 09:00


 


 


  (NS Flush)  2 ml  UNSCH  PRN


 IV FLUSH  5/20/18 14:00


     


 


 


  (Ecotrin Ec)  325 mg  DAILY


 PO  5/21/18 09:00


    5/22/18 09:14


 


 


  (Morphine Inj)  2 mg  Q30M  PRN


 IV PUSH  5/20/18 14:00


     


 


 


  (Proair Hfa Inh)  2 puff  Q6HR  PRN


 INH  5/20/18 14:15


     


 


 


  (Lipitor)  40 mg  HS


 PO  5/20/18 21:00


    5/21/18 21:16


 


 


  (Aricept)  5 mg  HS


 PO  5/20/18 21:00


    5/21/18 21:16


 


 


  (Protonix)  40 mg  DAILY


 PO  5/21/18 09:00


    5/22/18 09:14


 


 


  (Dilantin)  100 mg  BID


 PO  5/20/18 21:00


    5/22/18 09:14


 


 


  (Zoloft)  50 mg  DAILY


 PO  5/21/18 09:00


    5/22/18 09:14


 


 


  (Desyrel)  100 mg  HS


 PO  5/20/18 21:00


    5/21/18 21:16


 


 


  (Lopressor)  25 mg  BID


 PO  5/21/18 09:00


    5/22/18 09:14


 


 


  (Morphine Inj)  2 mg  Q4H  PRN


 IV PUSH  5/20/18 16:15


    5/21/18 16:36


 


 


  (Morphine Inj)  4 mg  Q3H  PRN


 IV PUSH  5/20/18 16:15


    5/21/18 01:07


 


 


  (Valium)  5 mg  ON  CALL


 PO  5/21/18 05:00


 5/25/18 04:59   


 


 


  (Benadryl)  25 mg  ON  CALL


 PO  5/21/18 05:00


 5/25/18 04:59   


 


 


  (Atropine Inj)  0.5 mg  UNSCH  PRN


 IV PUSH  5/21/18 08:30


     


 


 


  (Reglan Inj)  10 mg  Q4H  PRN


 IV PUSH  5/21/18 08:30


     


 


 


  (Norvasc)  5 mg  DAILY


 PO  5/22/18 09:15


     


 


 


  (Cozaar)  25 mg  DAILY


 PO  5/23/18 09:00


     


 








Vital Signs / I&O





Vital Signs








  Date Time  Temp Pulse Resp B/P (MAP) Pulse Ox O2 Delivery O2 Flow Rate FiO2


 


5/22/18 07:00     100 Room Air  


 


5/22/18 07:00 97.9 80 14 139/92 (108) 100   


 


5/22/18 04:00  73 16     


 


5/22/18 04:00  73      


 


5/22/18 03:13  77      


 


5/22/18 02:00  76      


 


5/22/18 01:00  72      


 


5/22/18 00:00  74      


 


5/22/18 00:00  73      


 


5/22/18 00:00  74 16     


 


5/21/18 23:00  84      


 


5/21/18 22:00  90      


 


5/21/18 21:00  86      


 


5/21/18 20:56 98.1 85 16 143/96 (112) 98   


 


5/21/18 20:05  92      


 


5/21/18 20:02      Room Air  21


 


5/21/18 20:00  92      


 


5/21/18 19:00  86      


 


5/21/18 18:00  92      


 


5/21/18 17:00  82      


 


5/21/18 16:55   20     


 


5/21/18 16:00  82      


 


5/21/18 15:00  81      


 


5/21/18 15:00 98.1 82 16 144/91 (108) 99   


 


5/21/18 14:00  72      


 


5/21/18 13:00  76      


 


5/21/18 12:00  70      


 


5/21/18 11:00 97.4 70 16 146/94 (111) 98   


 


5/21/18 11:00  68      














I/O      


 


 5/21/18 5/21/18 5/21/18 5/22/18 5/22/18 5/22/18





 07:00 15:00 23:00 07:00 15:00 23:00


 


Intake Total 1380 ml  720 ml 600 ml  


 


Output Total 600 ml  300 ml 400 ml  


 


Balance 780 ml  420 ml 200 ml  


 


      


 


Intake Oral 480 ml  720 ml 600 ml  


 


IV Total 900 ml     


 


Output Urine Total 600 ml  300 ml 400 ml  


 


# Voids   2   


 


# Bowel Movements 0     








Physical Exam


Alert


Chest Clear


 CV S1S2 RRR


right groin OK, foot perfused


Laboratory





Laboratory Tests








Test


  5/21/18


11:02 5/21/18


19:12 5/22/18


00:22 5/22/18


06:12


 


Activated Partial


Thromboplast Time 24.8 SEC 


  


  


  


 


 


Hemoglobin  11.2 GM/DL  10.9 GM/DL  11.1 GM/DL 


 


Hematocrit  34.2 %  32.9 %  33.4 % 


 


White Blood Count    5.1 TH/MM3 


 


Red Blood Count    3.42 MIL/MM3 


 


Mean Corpuscular Volume    97.5 FL 


 


Mean Corpuscular Hemoglobin    32.3 PG 


 


Mean Corpuscular Hemoglobin


Concent 


  


  


  33.1 % 


 


 


Red Cell Distribution Width    13.8 % 


 


Platelet Count    81 TH/MM3 


 


Mean Platelet Volume    9.8 FL 


 


Neutrophils (%) (Auto)    60.5 % 


 


Lymphocytes (%) (Auto)    23.2 % 


 


Monocytes (%) (Auto)    13.4 % 


 


Eosinophils (%) (Auto)    2.3 % 


 


Basophils (%) (Auto)    0.6 % 


 


Neutrophils # (Auto)    3.1 TH/MM3 


 


Lymphocytes # (Auto)    1.2 TH/MM3 


 


Monocytes # (Auto)    0.7 TH/MM3 


 


Eosinophils # (Auto)    0.1 TH/MM3 


 


Basophils # (Auto)    0.0 TH/MM3 


 


CBC Comment    AUTO DIFF 


 


Differential Comment


  


  


  


  AUTO DIFF


CONFIRMED


 


Platelet Estimate    LOW 


 


Platelet Morphology Comment    NORMAL 


 


Blood Urea Nitrogen    11 MG/DL 


 


Creatinine    0.86 MG/DL 


 


Random Glucose    100 MG/DL 


 


Calcium Level    8.5 MG/DL 


 


Sodium Level    137 MEQ/L 


 


Potassium Level    4.1 MEQ/L 


 


Chloride Level    103 MEQ/L 


 


Carbon Dioxide Level    24.6 MEQ/L 


 


Anion Gap    9 MEQ/L 


 


Estimat Glomerular Filtration


Rate 


  


  


  77 ML/MIN 


 











Assessment and Plan


Problem List:  


(1) Elevated troponin


ICD Codes:  R74.8 - Abnormal levels of other serum enzymes


Plan:  non-obstructive CAD





(2) Aortic aneurysm


ICD Codes:  I71.9 - Aortic aneurysm of unspecified site, without rupture


(3) Hypertensive heart disease


ICD Codes:  I11.9 - Hypertensive heart disease without heart failure


(4) COPD (chronic obstructive pulmonary disease)


ICD Codes:  J44.9 - Chronic obstructive pulmonary disease, unspecified


Assessment and Plan


OK with me to Devante Willis MD May 22, 2018 10:06

## 2018-05-22 NOTE — HHI.DS
__________________________________________________





Discharge Summary


Admission Date


May 20, 2018 at 12:50


Discharge Date:  May 22, 2018


Admitting Diagnosis





NSTEMI





(1) NSTEMI (non-ST elevated myocardial infarction)


ICD Code:  I21.4 - Non-ST elevation (NSTEMI) myocardial infarction


(2) YFN (acute kidney injury)


ICD Code:  N17.9 - Acute kidney failure, unspecified


(3) Chest pain


ICD Code:  R07.9 - Chest pain


Status:  Acute


(4) Hx of abdominal aortic aneurysm


ICD Code:  Z86.79 - Hx of abdominal aortic aneurysm


Status:  Acute


(5) HTN (hypertension)


ICD Code:  I10 - Hypertension


Status:  Acute


(6) Thoracic aneurysm


ICD Code:  I71.2 - Thoracic aneurysm


Status:  Acute


(7) COPD (chronic obstructive pulmonary disease)


ICD Code:  J44.9 - Chronic obstructive pulmonary disease, unspecified


Brief History - From Admission


This is a 78-year-old female with past medical history significant for COPD, 

smoking, CAD, history of thoracic and aortic aneurysm who presents to [facility] complaining of chest pain.  The patient had been recently 

discharged from this institution on 5/17/2018 after being evaluated for chest 

pain and elevated troponins.  At the time the patient was seen by Dr. Stafford 

who thought that possibly elevated troponin level was secondary to 

subendocardial ischemia.  At the time the toe was discussed with the patient 

and given the option of medical therapy, SPECT or cardiac catheterization.  As 

per medical records medical therapy was chosen.





The patient presents with complaints of chest pain, substernal, rated as 10/10 

intensity, nonradiating started yesterday morning and continue through the 

night.  Denies palpitations, denies diaphoresis.  The patient complains of 

nausea and vomiting several times.  The patient also complains of periumbilical 

pain which is 9/10 intensity.  Patient states there is no aggravating or 

alleviating factor.


CBC/BMP:  


5/22/18 0612                                                                   

             5/22/18 0612





Significant Findings





Laboratory Tests








Test


  5/20/18


09:25 5/20/18


10:34 5/20/18


10:43 5/20/18


12:41


 


Platelet Count


  109 TH/MM3


(150-450) 


  


  


 


 


Monocytes (%) (Auto)


  12.6 %


(0.0-8.0) 


  


  


 


 


Prothrombin Time


  


  12.0 SEC


(9.8-11.6) 


  


 


 


Blood Urea Nitrogen


  


  


  19 MG/DL


(7-18) 


 


 


Creatinine


  


  


  1.08 MG/DL


(0.50-1.00) 


 


 


Albumin


  


  


  3.0 GM/DL


(3.4-5.0) 


 


 


Calcium Level


  


  


  8.4 MG/DL


(8.5-10.1) 


 


 


Alkaline Phosphatase


  


  


  154 U/L


() 


 


 


Estimat Glomerular Filtration


Rate 


  


  60 ML/MIN


(>89) 


 


 


Creatine Kinase MB


  


  


  8.0 NG/ML


(0.5-3.6) 


 


 


Troponin I


  


  


  0.45 NG/ML


(0.02-0.05) 0.48 NG/ML


(0.02-0.05)


 


Test


  5/20/18


18:17 5/21/18


01:23 5/21/18


05:59 5/21/18


11:02


 


Activated Partial


Thromboplast Time 34.7 SEC


(24.3-30.1) 44.8 SEC


(24.3-30.1) 


  


 


 


Creatine Kinase MB


  7.0 NG/ML


(0.5-3.6) 7.0 NG/ML


(0.5-3.6) 6.8 NG/ML


(0.5-3.6) 


 


 


Troponin I


  0.59 NG/ML


(0.02-0.05) 0.55 NG/ML


(0.02-0.05) 0.43 NG/ML


(0.02-0.05) 


 


 


Red Blood Count


  


  


  3.44 MIL/MM3


(4.00-5.30) 


 


 


Hemoglobin


  


  


  11.0 GM/DL


(11.6-15.3) 


 


 


Hematocrit


  


  


  34.0 %


(35.0-46.0) 


 


 


Platelet Count


  


  


  83 TH/MM3


(150-450) 


 


 


Monocytes (%) (Auto)


  


  


  13.0 %


(0.0-8.0) 


 


 


Platelet Estimate   LOW (NORMAL)  


 


Calcium Level


  


  


  8.4 MG/DL


(8.5-10.1) 


 


 


Estimat Glomerular Filtration


Rate 


  


  68 ML/MIN


(>89) 


 


 


Test


  5/21/18


19:12 5/22/18


00:22 5/22/18


06:12 


 


 


Hemoglobin


  11.2 GM/DL


(11.6-15.3) 10.9 GM/DL


(11.6-15.3) 11.1 GM/DL


(11.6-15.3) 


 


 


Hematocrit


  34.2 %


(35.0-46.0) 32.9 %


(35.0-46.0) 33.4 %


(35.0-46.0) 


 


 


Red Blood Count


  


  


  3.42 MIL/MM3


(4.00-5.30) 


 


 


Platelet Count


  


  


  81 TH/MM3


(150-450) 


 


 


Monocytes (%) (Auto)


  


  


  13.4 %


(0.0-8.0) 


 


 


Platelet Estimate   LOW (NORMAL)  


 


Estimat Glomerular Filtration


Rate 


  


  77 ML/MIN


(>89) 


 








PE at Discharge


awake and alert, no acute distress, very interactive and delightful


anicteric


lungs- no rales


regular rhythm


abdomen soft, good bowel sounds, nontendere


xtremiteis no edema


 neuro exam- unremarkable


Pt Condition on Discharge:  Stable


Discharge Disposition:  Disch w/ Home Health Serv


Discharge Time:  <= 30 minutes


Discharge Instructions


DIET: Follow Instructions for:  Heart Healthy Diet


Activities you can perform:  Weight Bearing as Amy


Follow up Referrals:  


PCP Follow-up - 05/25/18 with PCP





New Orders:  


CBC NO DIFF - 05/25/18





New Medications:  


Amlodipine (Norvasc) 5 Mg Tab


5 MG PO DAILY for HTN for 30 Days, #30 TAB





Losartan (Cozaar) 25 Mg Tab


25 MG PO DAILY for HTN for 30 Days, #30 TAB





 


Continued Medications:  


Albuterol 18 GM Inh (Ventolin Hfa 18 GM Inh) 90 Mcg/Act Aer


2 PUFF INH Q4-6H PRN for SHORTNESS OF BREATH, #1 INHALER 0 Refills





Atorvastatin (Atorvastatin) 40 Mg Tab


40 MG PO HS for Cholesterol Management, #30 TAB 0 Refills





B Complex W/ C (Vitamin B Complex-C) 1 Cap Cap








Cholecalciferol (Decara) 50,000 Unit Cap


20384 UNITS PO Q7D for Nutritional Supplement, #30 CAP 0 Refills





Donepezil (Donepezil) 5 Mg Tab


5 MG PO HS for Dementia, #30 TAB 0 Refills





Metoprolol Tartrate (Metoprolol Tartrate) 25 Mg Tab


25 MG PO BID, #60 TAB 0 Refills





Pantoprazole (Pantoprazole) 40 Mg Tab


40 MG PO DAILY for Manage Heartburn for 30 Days, #30 TAB





Phenytoin Extended (Phenytoin Extended) 100 Mg Cap


100 MG PO BID for Control Seizures, #90 CAP 0 Refills





Sertraline (Sertraline) 50 Mg Tab


50 MG PO DAILY, #30 TAB 0 Refills





Trazodone (Trazodone) 50 Mg Tab


2 TAB PO HS for Control Depression, #30 TAB 0 Refills





 


Discontinued Medications:  


Hydralazine HCl (Hydralazine HCl) 50 Mg Tablet


50 MG PO Q8HR for Blood Pressure Management for 30 Days, #90 TAB





Hydrochlorothiazide (Hydrochlorothiazide) 25 Mg Tab


25 MG PO DAILY, #30 TAB 0 Refills





Losartan (Losartan) 50 Mg Tab


50 MG PO DAILY for Blood Pressure Management, #30 TAB 0 Refills





Nifedipine (Nifedipine ER) 90 Mg Tab


90 MG PO DAILY for Blood Pressure Management for 30 Days, #30 TAB

















Francis Darling MD May 22, 2018 17:27

## 2018-05-22 NOTE — HHI.PR
Subjective


Remarks


awake and alert no complains of chest discomfort/shortness of breath or 

abdominal pain





Objective


Vitals





Vital Signs








  Date Time  Temp Pulse Resp B/P (MAP) Pulse Ox O2 Delivery O2 Flow Rate FiO2


 


5/22/18 07:00     100 Room Air  


 


5/22/18 07:00 97.9 80 14 139/92 (108) 100   


 


5/22/18 04:00  73 16     


 


5/22/18 04:00  73      


 


5/22/18 03:13  77      


 


5/22/18 02:00  76      


 


5/22/18 01:00  72      


 


5/22/18 00:00  74      


 


5/22/18 00:00  73      


 


5/22/18 00:00  74 16     


 


5/21/18 23:00  84      


 


5/21/18 22:00  90      


 


5/21/18 21:00  86      


 


5/21/18 20:56 98.1 85 16 143/96 (112) 98   


 


5/21/18 20:05  92      


 


5/21/18 20:02      Room Air  21


 


5/21/18 20:00  92      


 


5/21/18 19:00  86      


 


5/21/18 18:00  92      


 


5/21/18 17:00  82      


 


5/21/18 16:55   20     


 


5/21/18 16:00  82      


 


5/21/18 15:00  81      


 


5/21/18 15:00 98.1 82 16 144/91 (108) 99   


 


5/21/18 14:00  72      


 


5/21/18 13:00  76      


 


5/21/18 12:00  70      


 


5/21/18 11:00 97.4 70 16 146/94 (111) 98   


 


5/21/18 11:00  68      


 


5/21/18 10:00  72      














I/O      


 


 5/21/18 5/21/18 5/21/18 5/22/18 5/22/18 5/22/18





 07:00 15:00 23:00 07:00 15:00 23:00


 


Intake Total 1380 ml  720 ml 600 ml  


 


Output Total 600 ml  300 ml 400 ml  


 


Balance 780 ml  420 ml 200 ml  


 


      


 


Intake Oral 480 ml  720 ml 600 ml  


 


IV Total 900 ml     


 


Output Urine Total 600 ml  300 ml 400 ml  


 


# Voids   2   


 


# Bowel Movements 0     








Result Diagram:  


5/22/18 0612                                                                   

             5/22/18 0612





Imaging





Last Impressions








Aorta CTA 5/20/18 1001 Signed





Impressions: 





 Service Date/Time:  Morgan, May 20, 2018 10:03 - CONCLUSION:  No significant 





 change in the thoracic aortic, abdominal aortic, and iliac artery aneurysms 

with 





 mural thrombus and dissection flap when compared to CTA performed 5 days ago.  





 Please refer to the report of the prior CTA for a detailed description of the 





 findings.          Edmundo Allen MD 


 


Chest X-Ray 5/20/18 0920 Signed





Impressions: 





 Service Date/Time:  Morgan, May 20, 2018 10:19 - CONCLUSION:  No acute 

findings 





 in the chest.  No evidence of pleural effusion or pneumothorax.     Edmundo Allen MD 








Objective Remarks


awake and alert, no acute distress, very interactive and delightful


anicteric


lungs- no rales


regular rhythm


abdomen soft, good bowel sounds, nontendere


xtremiteis no edema


 neuro exam- unremarkable





A/P


Problem List:  


(1) NSTEMI (non-ST elevated myocardial infarction)


ICD Code:  I21.4 - Non-ST elevation (NSTEMI) myocardial infarction


(2) YFN (acute kidney injury)


ICD Code:  N17.9 - Acute kidney failure, unspecified


(3) Chest pain


ICD Code:  R07.9 - Chest pain


Status:  Acute


(4) Hx of abdominal aortic aneurysm


ICD Code:  Z86.79 - Hx of abdominal aortic aneurysm


Status:  Acute


(5) HTN (hypertension)


ICD Code:  I10 - Hypertension


Status:  Acute


(6) Thoracic aneurysm


ICD Code:  I71.2 - Thoracic aneurysm


Status:  Acute


(7) COPD (chronic obstructive pulmonary disease)


ICD Code:  J44.9 - Chronic obstructive pulmonary disease, unspecified


Assessment and Plan


78-year-old female with history of HTN, abdominal and thoracic aortic aneurysms

, COPD, GERD, seizure disorder, depression, presents with intermittent chest 

pains.


Chest pain with elevated troponin


    -With history of thoracic and abdominal aneurysms, checked aorta CTA which 

showed slight enlargement of a sending aortic aneurysm, however improved and 

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 

thorax, abdomen, iliac arteries, all slightly enlarged compared to previous 

exam in 2015 but no acute dissection.  


   -Patient reportedly has been evaluated at [facility] for her aneurysms, told she 

would likely not survive procedure- OP ff up with [facility]


   -Troponins elevated at 0.45.  Patient had a troponin of 0.36 on discharge.  

Continue to trend troponin.  


   - control BP- restart home meds





Thoracic/Abdominal/Iliac Aneurysms: chronic


   -Aorta CTA checked as above, shows gradual enlargements of aneurysms 

compared to 2015


   -Previously evaluated by [facility], not surgical candidate


   -Control BP, much improved


   -- OP ff up with [facility]





Hypertension:  


   -Patient previously with accelerated hypertension.     


   - restart meds- Amlodipine, BB, ARB


Dementia: Chronic


   -continue donepezil.





Seizure disorder.  Chronic.


   -Continue phenytoin.


   -Seems stable.





Depression: Chronic.  


   -Continue Zoloft.  


   -Will hold off on trazodone due to hypertension.





GERD: Chronic.  


   -Continue PPI.





Tobacco abuse: Chronic


   -Patient counseled on cessation.





Chronic ongoing weight loss: This could be secondary to dementia.  


   -Tobacco cessation could be helpful.  


   -Encourage p.o. intake.





Hyperlipidemia: chronic


   -continue patient's statin





Acute kidney injury- resolved 





chronic thrombocytopenia- recheck as OP- OP ff up with PCP


   


DVT Prophylaxis: SCDs


DC today- OP ff up with PCP


arrange for home health care - nursing-





Problem Qualifiers





(1) HTN (hypertension):  


Qualified Codes:  I10 - Essential (primary) hypertension








Francis Darling MD May 22, 2018 09:14

## 2020-03-15 NOTE — PD
HPI


Chief Complaint:  Chest Pain


Time Seen by Provider:  10:14


Travel History


International Travel<30 days:  No


Contact w/Intl Traveler<30days:  No


Traveled to known affect area:  No





History of Present Illness


HPI


The patient is a 78-year-old  female who presents to the 

emergency department via EMS from her physician's office for chest pain.  The 

patient states she developed chest pain in April which has been intermittent, 

substernal left-sided, occasionally radiating down the left arm, and associated 

with nausea.  She does occasionally note shortness of breath, but denies any 

diaphoresis.  The patient denies any known history of coronary artery disease 

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

, however, she cannot explain why they were inoperable.  The patient went to 

see her primary physician earlier today who performed an EKG and sent the 

patient to the emergency department via EMS.  Upon arrival the patient is chest 

pain-free.  The patient does note she worries significantly about her  

at home who is a diabetic and tends to fall out of bed.  The patient denies any 

numbness or tingling of the lower extremities.  Symptoms are moderate and and 

intermittent.





PFSH


Past Medical History


Hx Anticoagulant Therapy:  No


Arthritis:  Yes


Asthma:  No


Autoimmune Disease:  No


Blood Disorders:  No


Anxiety:  Yes


Depression:  No


Heart Rhythm Problems:  Yes


Cancer:  No


Cardiac Catheterization:  No


Cardiovascular Problems:  Yes (hld, htn, Thoracia Aneurysm)


High Cholesterol:  Yes


Chemotherapy:  No


Chest Pain:  Yes


Congestive Heart Failure:  No


COPD:  No


Cerebrovascular Accident:  No


Dementia:  Yes


Diabetes:  No


Diminished Hearing:  No


Endocrine:  Yes


Gastrointestinal Disorders:  Yes


GERD:  No


Glaucoma:  No


Genitourinary:  No


Headaches:  No


Hepatitis:  No


Hiatal Hernia:  No


Hypertension:  Yes


Immune Disorder:  No


Implanted Vascular Access Dvce:  No


Kidney Stones:  No


Musculoskeletal:  Yes


Neurologic:  Yes


Psychiatric:  Yes


Reproductive:  No


Respiratory:  Yes


Migraines:  No


Myocardial Infarction:  Yes


Pneumonia:  Yes


Radiation Therapy:  No


Renal Failure:  Yes (CKD 3, per medical record from DR office)


Seizures:  Yes


Sickle Cell Disease:  No


Sleep Apnea:  No


Thyroid Disease:  Yes


Ulcer:  No


Tetanus Vaccination:  Unknown


PNEUMOCCOCAL Vaccine (Year):  1


Menopausal:  Yes


:  2


Para:  2





Past Surgical History


Abdominal Surgery:  No


AICD:  No


Appendectomy:  No


Arteriovenous Shunt:  No


Cardiac Surgery:  Yes (aneurysm)


Cholecystectomy:  Yes


Coronary Artery Bypass Graft:  No


Ear Surgery:  No


Endocrine Surgery:  No


Eye Surgery:  No


Genitourinary Surgery:  No


Gynecologic Surgery:  Yes (hysterectomy)


Hysterectomy:  Yes


Insulin Pump:  No


Joint Replacement:  No


Neurologic Surgery:  Yes (BRAIN SURGERY DUE TO ANEURYSM)


Oral Surgery:  No


Pacemaker:  No


Thoracic Surgery:  No


Other Surgery:  Yes





Family History


Family Myocardial Infarction:  Yes





Social History


Alcohol Use:  No


Tobacco Use:  Yes (5-6 CIGS PER DAY )


Substance Use:  No





Allergies-Medications


(Allergen,Severity, Reaction):  


Coded Allergies:  


     *MDRO Multi-Drug Resistant Organism (Verified  Allergy, Unknown, 5/15/18)


 C-diff 2014


     acetaminophen (Verified  Allergy, Unknown, 5/15/18)


     codeine (Verified  Allergy, Unknown, 5/15/18)


     MRI PRECAUTION (Verified  Adverse Reaction, Severe, ANEURYSM CLIP-(JLT)   DR. MERCADO, 5/15/18)


Reported Meds & Prescriptions





Reported Meds & Active Scripts


Active


Reported


Vitamin B Complex-C (B Complex W/ C) 1 Cap Cap   


Meloxicam 7.5 Mg Tab 7.5 Mg PO DAILY


Atorvastatin (Atorvastatin Calcium) 40 Mg Tab 40 Mg PO HS


Decara (Cholecalciferol) 50,000 Unit Cap 50,000 Units PO Q7D


Trazodone (Trazodone HCl) 50 Mg Tab 2 Tab PO HS


Metoprolol Tartrate 25 Mg Tab 25 Mg PO BID


Hydrochlorothiazide 25 Mg Tab 25 Mg PO DAILY


Phenytoin Extended 100 Mg Cap 100 Mg PO BID


Omeprazole 40 Mg Cap 40 Mg  DAILY


Ventolin Hfa 18 GM Inh (Albuterol Sulfate) 90 Mcg/Act Aer 2 Puff INH Q4-6H PRN


Sertraline (Sertraline HCl) 50 Mg Tab 50 Mg PO DAILY


Losartan (Losartan Potassium) 50 Mg Tab 50 Mg PO DAILY


Donepezil 5 Mg Tab 5 Mg PO HS








Review of Systems


Except as stated in HPI:  all other systems reviewed are Neg


General / Constitutional:  No: Fever


HENT:  No: Headaches, Lightheadedness, Neck Stiffness, Neck Pain


Cardiovascular:  Positive: Chest Pain or Discomfort, No: Diaphoresis, Dyspnea 

on exertion


Respiratory:  Positive: Shortness of Breath


Gastrointestinal:  Positive: Nausea, Vomiting, No: Abdominal Pain


Musculoskeletal:  No: Pain


Neurologic:  No: Dizziness, Syncope, Focal Abnormalities





Physical Exam


Narrative


GENERAL: Awake, alert, pleasant 78-year-old female who appears her stated age 

and is in no acute respiratory distress.


SKIN: Focused skin assessment warm/dry.


HEAD: Atraumatic. Normocephalic. 


EYES: No injection or drainage.


ENT: No nasal bleeding or discharge.  Mucous membranes pink and moist.


NECK: Trachea midline. No JVD. 


CARDIOVASCULAR: Regular rate and rhythm.  No murmur appreciated.  Palpation of 

the chest wall does not reproduce symptoms.


RESPIRATORY: No accessory muscle use. Clear to auscultation. Breath sounds 

equal bilaterally. 


GASTROINTESTINAL: Abdomen soft, non-tender, nondistended.  No rebound 

tenderness.


MUSCULOSKELETAL: No obvious deformities. No clubbing.  No cyanosis.  No edema. 


NEUROLOGICAL: Awake and alert. No obvious cranial nerve deficits.  Motor 

grossly within normal limits. Normal speech.


PSYCHIATRIC: Appropriate mood and affect; insight and judgment normal.





Data


Data


Last Documented VS





Vital Signs








  Date Time  Temp Pulse Resp B/P (MAP) Pulse Ox O2 Delivery O2 Flow Rate FiO2


 


5/15/18 12:17    144/102 (116)    


 


5/15/18 11:31  78 16  96 Room Air  


 


5/15/18 10:16 98.4       








Orders





 Orders


Electrocardiogram (5/15/18 10:31)


Ckmb (Isoenzyme) Profile (5/15/18 10:31)


Complete Blood Count With Diff (5/15/18 10:31)


Comprehensive Metabolic Panel (5/15/18 10:31)


Magnesium (Mg) (5/15/18 10:31)


Prothrombin Time / Inr (Pt) (5/15/18 10:31)


Act Partial Throm Time (Ptt) (5/15/18 10:31)


Troponin I (5/15/18 10:31)


Lipase (5/15/18 10:31)


Chest, Single Ap (5/15/18 10:31)


Ecg Monitoring (5/15/18 10:31)


Bilateral Bp Monitoring (5/15/18 10:31)


Iv Access Insert/Monitor (5/15/18 10:31)


Oximetry (5/15/18 10:31)


Oxygen Administration (5/15/18 10:31)


Sodium Chloride 0.9% Flush (Ns Flush) (5/15/18 10:45)


Cta Thor Abd Aorta W Iv C W3d (5/15/18 10:31)


Labetalol Inj (Trandate Inj) (5/15/18 10:45)


Morphine Inj (Morphine Inj) (5/15/18 12:00)


Morphine Inj (Morphine Inj) (5/15/18 12:15)


Morphine Inj (Morphine Inj) (5/15/18 12:12)


Iohexol 350 Inj (Omnipaque 350 Inj) (5/15/18 12:48)


Admit Order (Ed Use Only) (5/15/18 14:33)





Labs





Laboratory Tests








Test


  5/15/18


10:40


 


White Blood Count 4.9 TH/MM3 


 


Red Blood Count 4.03 MIL/MM3 


 


Hemoglobin 13.1 GM/DL 


 


Hematocrit 39.7 % 


 


Mean Corpuscular Volume 98.7 FL 


 


Mean Corpuscular Hemoglobin 32.4 PG 


 


Mean Corpuscular Hemoglobin


Concent 32.9 % 


 


 


Red Cell Distribution Width 13.6 % 


 


Platelet Count 107 TH/MM3 


 


Mean Platelet Volume 9.3 FL 


 


Neutrophils (%) (Auto) 60.0 % 


 


Lymphocytes (%) (Auto) 25.8 % 


 


Monocytes (%) (Auto) 12.8 % 


 


Eosinophils (%) (Auto) 0.5 % 


 


Basophils (%) (Auto) 0.9 % 


 


Neutrophils # (Auto) 2.9 TH/MM3 


 


Lymphocytes # (Auto) 1.3 TH/MM3 


 


Monocytes # (Auto) 0.6 TH/MM3 


 


Eosinophils # (Auto) 0.0 TH/MM3 


 


Basophils # (Auto) 0.0 TH/MM3 


 


CBC Comment DIFF FINAL 


 


Differential Comment  


 


Prothrombin Time 11.6 SEC 


 


Prothromb Time International


Ratio 1.1 RATIO 


 


 


Activated Partial


Thromboplast Time 26.0 SEC 


 


 


Blood Urea Nitrogen 9 MG/DL 


 


Creatinine 0.77 MG/DL 


 


Random Glucose 82 MG/DL 


 


Total Protein 7.2 GM/DL 


 


Albumin 3.2 GM/DL 


 


Calcium Level 8.1 MG/DL 


 


Magnesium Level 1.8 MG/DL 


 


Alkaline Phosphatase 154 U/L 


 


Aspartate Amino Transf


(AST/SGOT) 28 U/L 


 


 


Alanine Aminotransferase


(ALT/SGPT) 14 U/L 


 


 


Total Bilirubin 0.4 MG/DL 


 


Sodium Level 139 MEQ/L 


 


Potassium Level 3.7 MEQ/L 


 


Chloride Level 105 MEQ/L 


 


Carbon Dioxide Level 23.9 MEQ/L 


 


Anion Gap 10 MEQ/L 


 


Estimat Glomerular Filtration


Rate 88 ML/MIN 


 


 


Total Creatine Kinase 87 U/L 


 


Troponin I


  LESS THAN 0.02


NG/ML


 


Lipase 483 U/L 











[facility]


Medical Decision Making


Medical Screen Exam Complete:  Yes


Emergency Medical Condition:  Yes


Medical Record Reviewed:  Yes


Interpretation(s)


EKG reveals normal sinus rhythm with a rate 82.  Inverted T waves noted in lead 

V2, V3, V4, V5, V6, 1, and aVL.  The T-wave changes in lead V3 through V6 and I 

and aVL are new when compared to previous EKG.








Last Impressions








Chest X-Ray 5/15/18 1031 Signed





Impressions: 





 Service Date/Time:  Tuesday, May 15, 2018 11:10 - CONCLUSION: No acute 

disease.  





      Keanu Best MD 


 


Aorta CTA 5/15/18 1031 Signed





Impressions: 





 Service Date/Time:  Tuesday, May 15, 2018 12:25 - CONCLUSION:  1. The 





 examination demonstrates aneurysmal dilation of the ascending aorta aortic 

arch 





 and descending thoracic aorta. The aorta at its widest point in the arch 





 measures approximately 3.8 cm. This is only slightly larger than seen on 





 previous exam it should be noted on the prior exam there appears to have been 

an 





 acute thrombosis of a dissection flap. This has significantly improved in 





 appearance with partial healing. The proximal descending thoracic aorta is at 





 the upper limits of normal in caliber measuring 2.7 cm. 2. The distal thoracic 





 aorta demonstrates a focal area of contrast extending out into the mural 





 thrombus. This appears to be feeding the origin of an intercostal artery. 

There 





 was contrast in this area on the previous study of  however, it appears 

more 





 organized and slightly larger on today's exam.  3. The distalmost portion of 





 thoracic aorta is aneurysmal. Maximum dimension is essentially at the 





 diaphragmatic hiatus. The aorta measures 5.4 centimeters at this level. This 

is 





 similar in size compared to previous. 4. The abdominal aorta is aneurysmal in 





 its distal segment measuring 5.6 cm. This has enlarged when compared to 





 previous. The aorta measured 3.7 cm at this location on the prior. 5. 

Aneurysmal 





 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 The right 





 measures approximately 2.5 cm. The iliac circulation is mildly enlarged 

compared 





 to previous as well. Maximum dimension on the left was 2.2 cm on the prior.   

  





 Riaz Wilde MD 








Laboratory Tests








Test


  5/15/18


10:40


 


White Blood Count 4.9 TH/MM3 


 


Red Blood Count 4.03 MIL/MM3 


 


Hemoglobin 13.1 GM/DL 


 


Hematocrit 39.7 % 


 


Mean Corpuscular Volume 98.7 FL 


 


Mean Corpuscular Hemoglobin 32.4 PG 


 


Mean Corpuscular Hemoglobin


Concent 32.9 % 


 


 


Red Cell Distribution Width 13.6 % 


 


Platelet Count 107 TH/MM3 


 


Mean Platelet Volume 9.3 FL 


 


Neutrophils (%) (Auto) 60.0 % 


 


Lymphocytes (%) (Auto) 25.8 % 


 


Monocytes (%) (Auto) 12.8 % 


 


Eosinophils (%) (Auto) 0.5 % 


 


Basophils (%) (Auto) 0.9 % 


 


Neutrophils # (Auto) 2.9 TH/MM3 


 


Lymphocytes # (Auto) 1.3 TH/MM3 


 


Monocytes # (Auto) 0.6 TH/MM3 


 


Eosinophils # (Auto) 0.0 TH/MM3 


 


Basophils # (Auto) 0.0 TH/MM3 


 


CBC Comment DIFF FINAL 


 


Differential Comment  


 


Prothrombin Time 11.6 SEC 


 


Prothromb Time International


Ratio 1.1 RATIO 


 


 


Activated Partial


Thromboplast Time 26.0 SEC 


 


 


Blood Urea Nitrogen 9 MG/DL 


 


Creatinine 0.77 MG/DL 


 


Random Glucose 82 MG/DL 


 


Total Protein 7.2 GM/DL 


 


Albumin 3.2 GM/DL 


 


Calcium Level 8.1 MG/DL 


 


Magnesium Level 1.8 MG/DL 


 


Alkaline Phosphatase 154 U/L 


 


Aspartate Amino Transf


(AST/SGOT) 28 U/L 


 


 


Alanine Aminotransferase


(ALT/SGPT) 14 U/L 


 


 


Total Bilirubin 0.4 MG/DL 


 


Sodium Level 139 MEQ/L 


 


Potassium Level 3.7 MEQ/L 


 


Chloride Level 105 MEQ/L 


 


Carbon Dioxide Level 23.9 MEQ/L 


 


Anion Gap 10 MEQ/L 


 


Estimat Glomerular Filtration


Rate 88 ML/MIN 


 


 


Total Creatine Kinase 87 U/L 


 


Troponin I


  LESS THAN 0.02


NG/ML


 


Lipase 483 U/L 








Differential Diagnosis


Differential diagnosis includes acute coronary syndrome, aortic dissection, 

thoracic aneurysm, pulmonary embolism, GERD, esophageal spasm, costochondritis, 

anxiety.


Narrative Course


IV was established, labs are drawn and sent, the patient was placed on cardiac 

telemetry monitoring and continuous pulse oximetry monitoring.  EKG was ordered 

and interpreted.  Chest x-ray was obtained.  The patient is currently chest pain

-free, therefore, no medications were acutely administered, however, CTA of the 

chest and abdomen were obtained to evaluate for possible dissection, prior to 

anticoagulation with aspirin.  CTA of the chest was reviewed, some of the 

aneurysms are stable, some are larger, however, I did review the patient's 

previous admission in  and notes by the cardiothoracic surgeons at that 

time.  They stated the patient had non-operative aneurysms and dissection at 

that time.  The patient's abdominal aneurysm has enlarged.  The patient 

apparently was evaluated at [facility] and was deemed a nonoperative 

candidate.  It appears the patient needs better blood pressure control, will be 

admitted to the medical service for observation of patient's blood pressure.  

Patient is comfortable with this plan of care and disposition.  The patient did 

have EKG findings which revealed new inverted T waves, may benefit from serial 

cardiac enzymes.





Physician Communication


Physician Communication


The [facility] has [employer], [facility]





Diagnosis





 Primary Impression:  


 Atypical chest pain


 Additional Impressions:  


 Hx of abdominal aortic aneurysm


 Accelerated hypertension





Admitting Information


Admitting Physician Requests:  Admit


Condition:  Stable











Feng Hoffman MD May 15, 2018 10:36 No